# Patient Record
Sex: FEMALE | Race: WHITE | Employment: OTHER | URBAN - METROPOLITAN AREA
[De-identification: names, ages, dates, MRNs, and addresses within clinical notes are randomized per-mention and may not be internally consistent; named-entity substitution may affect disease eponyms.]

---

## 2017-01-30 ENCOUNTER — ANTI-COAG VISIT (OUTPATIENT)
Dept: FAMILY MEDICINE CLINIC | Facility: CLINIC | Age: 67
End: 2017-01-30

## 2017-01-30 DIAGNOSIS — Z79.01 LONG TERM CURRENT USE OF ANTICOAGULANT THERAPY: Primary | ICD-10-CM

## 2017-01-30 PROBLEM — D68.59 PRIMARY HYPERCOAGULABLE STATE (HCC): Status: ACTIVE | Noted: 2017-01-30

## 2017-01-30 PROBLEM — F32.1 MAJOR DEPRESSIVE DISORDER, SINGLE EPISODE, MODERATE (HCC): Status: ACTIVE | Noted: 2017-01-30

## 2017-01-30 LAB
INR: 2 (ref 0.8–1.2)

## 2017-01-30 RX ORDER — CHLORAL HYDRATE 500 MG
1000 CAPSULE ORAL DAILY
COMMUNITY
Start: 2012-02-01

## 2017-01-30 RX ORDER — CELECOXIB 200 MG/1
CAPSULE ORAL
COMMUNITY
Start: 2007-01-29 | End: 2017-07-17

## 2017-01-30 RX ORDER — ESOMEPRAZOLE MAGNESIUM 40 MG/1
CAPSULE, DELAYED RELEASE ORAL
COMMUNITY
Start: 2009-02-25 | End: 2017-07-16

## 2017-01-30 RX ORDER — WARFARIN SODIUM 1 MG/1
TABLET ORAL
COMMUNITY
Start: 2014-04-15 | End: 2018-01-15

## 2017-01-30 RX ORDER — LYSINE 500 MG
1 TABLET ORAL DAILY
COMMUNITY
Start: 2012-02-01 | End: 2019-03-22

## 2017-01-30 RX ORDER — DIMENHYDRINATE 50 MG
1 TABLET ORAL DAILY
COMMUNITY
Start: 2013-05-22

## 2017-01-30 RX ORDER — BIOTIN 5 MG
1 TABLET ORAL DAILY
COMMUNITY
Start: 2014-04-01

## 2017-01-30 RX ORDER — TRIAMCINOLONE ACETONIDE 5 MG/G
CREAM TOPICAL
COMMUNITY
Start: 2011-04-05 | End: 2017-04-20

## 2017-01-30 RX ORDER — DESONIDE 0.5 MG/G
CREAM TOPICAL AS NEEDED
COMMUNITY
Start: 2009-01-07 | End: 2018-03-14

## 2017-01-30 RX ORDER — FLUOXETINE HYDROCHLORIDE 40 MG/1
CAPSULE ORAL
COMMUNITY
Start: 2014-09-03 | End: 2017-03-30

## 2017-01-30 RX ORDER — METOPROLOL SUCCINATE 50 MG/1
TABLET, EXTENDED RELEASE ORAL
COMMUNITY
Start: 2011-02-22 | End: 2017-07-16

## 2017-01-30 RX ORDER — MULTIVITAMIN
1 CAPSULE ORAL DAILY
COMMUNITY
Start: 2007-01-30

## 2017-01-30 RX ORDER — WARFARIN SODIUM 5 MG/1
TABLET ORAL
COMMUNITY
Start: 2014-03-05 | End: 2018-01-15

## 2017-01-30 NOTE — PROGRESS NOTES
Home INR meter: INR is in goal range at 2.0. CPM coumadin. Michelle Kaye left for patient to return call. Leana Fine, 01/30/2017, 4:03 PM  Patient notified of coumadin instructions.  Leana Fine, 01/31/2017, 2:07 PM

## 2017-02-01 NOTE — PATIENT INSTRUCTIONS
INR is in goal range. Continue same coumadin dosage. Watch for bleeding problems such as black tarry stools, blood in urine, frequent or prolonged nose bleeds, unusual bruising, or any other unusual bleeding.  Call or seek medical attention ( if office

## 2017-03-08 ENCOUNTER — ANTI-COAG VISIT (OUTPATIENT)
Dept: FAMILY MEDICINE CLINIC | Facility: CLINIC | Age: 67
End: 2017-03-08

## 2017-03-08 DIAGNOSIS — Z79.01 LONG TERM CURRENT USE OF ANTICOAGULANT THERAPY: Primary | ICD-10-CM

## 2017-03-08 LAB — INR: 2.7 (ref 0.8–1.2)

## 2017-03-23 ENCOUNTER — ANTI-COAG VISIT (OUTPATIENT)
Dept: FAMILY MEDICINE CLINIC | Facility: CLINIC | Age: 67
End: 2017-03-23

## 2017-03-23 DIAGNOSIS — Z79.01 LONG TERM CURRENT USE OF ANTICOAGULANT THERAPY: Primary | ICD-10-CM

## 2017-03-23 LAB — INR: 2.6 (ref 0.8–1.2)

## 2017-03-23 NOTE — PROGRESS NOTES
INR on home meter is in goal range. CPM of coumadin and recheck INR in 2 weeks. Patient notified of coumadin instructions.

## 2017-03-31 RX ORDER — FLUOXETINE HYDROCHLORIDE 40 MG/1
CAPSULE ORAL
Qty: 90 CAPSULE | Refills: 1 | Status: SHIPPED | OUTPATIENT
Start: 2017-03-31 | End: 2018-01-29

## 2017-04-20 RX ORDER — TRIAMCINOLONE ACETONIDE 5 MG/G
CREAM TOPICAL
Qty: 15 G | Refills: 2 | Status: SHIPPED | OUTPATIENT
Start: 2017-04-20 | End: 2018-03-14

## 2017-04-25 ENCOUNTER — TELEPHONE (OUTPATIENT)
Dept: FAMILY MEDICINE CLINIC | Facility: CLINIC | Age: 67
End: 2017-04-25

## 2017-04-25 ENCOUNTER — ANTI-COAG VISIT (OUTPATIENT)
Dept: FAMILY MEDICINE CLINIC | Facility: CLINIC | Age: 67
End: 2017-04-25

## 2017-04-25 DIAGNOSIS — Z79.01 LONG TERM CURRENT USE OF ANTICOAGULANT THERAPY: Primary | ICD-10-CM

## 2017-04-25 NOTE — TELEPHONE ENCOUNTER
Patient returned call with INR results from today. See anticoagulation encounter for INR and coumadin management.

## 2017-04-25 NOTE — TELEPHONE ENCOUNTER
FYI:  Patient called to report INR results she took on home meter. INR is 4.0. States she accidentally took her coumadin dose twice yesterday for a total of 16mg. Her normal dose for Monday is 8mg. Denies any symptoms of bleeding.      Patient advised t

## 2017-04-25 NOTE — PROGRESS NOTES
Patient called with INR results from home meter. INR too high at 4.0. Patient states she thinks she took two tablets yesterday by accident. This was a total of 16mg of coumadin. Her normal dose for mondays is 8mg. Denies any signs of bleeding.   Advise

## 2017-04-26 ENCOUNTER — TELEPHONE (OUTPATIENT)
Dept: FAMILY MEDICINE CLINIC | Facility: CLINIC | Age: 67
End: 2017-04-26

## 2017-04-26 ENCOUNTER — ANTI-COAG VISIT (OUTPATIENT)
Dept: FAMILY MEDICINE CLINIC | Facility: CLINIC | Age: 67
End: 2017-04-26

## 2017-04-26 DIAGNOSIS — Z79.01 LONG TERM CURRENT USE OF ANTICOAGULANT THERAPY: Primary | ICD-10-CM

## 2017-04-26 NOTE — TELEPHONE ENCOUNTER
Patient was to check INR today on home meter. No results by fax from [facility] received. Message left for patient to check INR as soon as possible. ( see previous anticoag encounters- coumadin overdose this week.  Needs daily INRs this week per Dr. Stevie Black

## 2017-04-26 NOTE — PROGRESS NOTES
INR coming down toward goal at 3.2. Had taken overdose of coumadin on 4/24/17 by accident. Per order Dr. Celeste Simmons, patient to check INR daily this week. Denies any signs of bleeding . Patient informed of coumadin instructions.

## 2017-04-27 ENCOUNTER — ANTI-COAG VISIT (OUTPATIENT)
Dept: FAMILY MEDICINE CLINIC | Facility: CLINIC | Age: 67
End: 2017-04-27

## 2017-04-27 DIAGNOSIS — Z79.01 LONG TERM CURRENT USE OF ANTICOAGULANT THERAPY: Primary | ICD-10-CM

## 2017-04-27 NOTE — PROGRESS NOTES
Patient called with home INR result. INR 2.1 today. Resume regular coumadin dose now and check INR again tomorrow per Dr. Andrew Quiroga. Ravi's previous order. ( accidental coumadin overdose 3 days ago)   Patient notified of coumadin instructions.

## 2017-05-01 ENCOUNTER — ANTI-COAG VISIT (OUTPATIENT)
Dept: FAMILY MEDICINE CLINIC | Facility: CLINIC | Age: 67
End: 2017-05-01

## 2017-05-01 DIAGNOSIS — Z79.01 LONG TERM CURRENT USE OF ANTICOAGULANT THERAPY: Primary | ICD-10-CM

## 2017-05-01 NOTE — PROGRESS NOTES
INR on home meter is in goal range. Resume regular coumadin dose. INR in one week. Patient notified of coumadin instructions.

## 2017-05-08 ENCOUNTER — ANTI-COAG VISIT (OUTPATIENT)
Dept: FAMILY MEDICINE CLINIC | Facility: CLINIC | Age: 67
End: 2017-05-08

## 2017-05-08 DIAGNOSIS — Z79.01 LONG TERM CURRENT USE OF ANTICOAGULANT THERAPY: Primary | ICD-10-CM

## 2017-05-08 NOTE — PROGRESS NOTES
Patient calling with INR results  INR on home meter is in goal range. CPM of coumadin and recheck INR in 2 weeks. Patient notified of coumadin instructions.

## 2017-05-22 ENCOUNTER — ANTI-COAG VISIT (OUTPATIENT)
Dept: FAMILY MEDICINE CLINIC | Facility: CLINIC | Age: 67
End: 2017-05-22

## 2017-05-22 DIAGNOSIS — Z79.01 LONG TERM CURRENT USE OF ANTICOAGULANT THERAPY: Primary | ICD-10-CM

## 2017-05-22 NOTE — PROGRESS NOTES
INR in goal at 2.9. [place] so not eating as much greens and vegetables. Will try to increase salad intake to one more weekly. INR in one month on home meter. Fax order sent to [facility] to resume home INR testing 1-4x monthly.

## 2017-06-24 ENCOUNTER — ANTI-COAG VISIT (OUTPATIENT)
Dept: FAMILY MEDICINE CLINIC | Facility: CLINIC | Age: 67
End: 2017-06-24

## 2017-06-24 DIAGNOSIS — Z79.01 LONG TERM CURRENT USE OF ANTICOAGULANT THERAPY: ICD-10-CM

## 2017-07-17 RX ORDER — CELECOXIB 200 MG/1
CAPSULE ORAL
Qty: 90 CAPSULE | Refills: 1 | Status: SHIPPED | OUTPATIENT
Start: 2017-07-17 | End: 2018-01-15

## 2017-07-18 RX ORDER — ESOMEPRAZOLE MAGNESIUM 40 MG/1
CAPSULE, DELAYED RELEASE ORAL
Qty: 90 CAPSULE | Refills: 0 | Status: SHIPPED | OUTPATIENT
Start: 2017-07-18 | End: 2017-10-13

## 2017-07-18 RX ORDER — METOPROLOL SUCCINATE 50 MG/1
TABLET, EXTENDED RELEASE ORAL
Qty: 90 TABLET | Refills: 0 | Status: SHIPPED | OUTPATIENT
Start: 2017-07-18 | End: 2017-09-25

## 2017-07-25 ENCOUNTER — ANTI-COAG VISIT (OUTPATIENT)
Dept: FAMILY MEDICINE CLINIC | Facility: CLINIC | Age: 67
End: 2017-07-25

## 2017-07-25 ENCOUNTER — TELEPHONE (OUTPATIENT)
Dept: FAMILY MEDICINE CLINIC | Facility: CLINIC | Age: 67
End: 2017-07-25

## 2017-07-25 DIAGNOSIS — Z79.01 LONG TERM CURRENT USE OF ANTICOAGULANT THERAPY: ICD-10-CM

## 2017-07-25 LAB — INR: 4.7 (ref 0.8–1.2)

## 2017-07-25 NOTE — PROGRESS NOTES
INR too high at 4.7 on home meter. Patient traveling, not eating much vegetables/greens. Also has had stomache flu symptoms with diarrhea. Improving now. Denies any black or bloody stool. Hold coumadin today. Check INR tomorrow.    Eat a serving of v

## 2017-07-26 ENCOUNTER — ANTI-COAG VISIT (OUTPATIENT)
Dept: FAMILY MEDICINE CLINIC | Facility: CLINIC | Age: 67
End: 2017-07-26

## 2017-07-26 DIAGNOSIS — Z79.01 LONG TERM CURRENT USE OF ANTICOAGULANT THERAPY: ICD-10-CM

## 2017-07-26 LAB — INR: 2.9 (ref 0.8–1.2)

## 2017-07-26 NOTE — PROGRESS NOTES
INR down into goal range now at 2.9. Resume coumadin at regular dose and check INR on home meter in 3 days. ( yesterday INR had been elevated to 4.7 due to travel and great change in diet)     Patient notified of coumadin instructions.    Still travelin

## 2017-07-28 ENCOUNTER — TELEPHONE (OUTPATIENT)
Dept: FAMILY MEDICINE CLINIC | Facility: CLINIC | Age: 67
End: 2017-07-28

## 2017-07-28 ENCOUNTER — ANTI-COAG VISIT (OUTPATIENT)
Dept: FAMILY MEDICINE CLINIC | Facility: CLINIC | Age: 67
End: 2017-07-28

## 2017-07-28 DIAGNOSIS — Z79.01 LONG TERM CURRENT USE OF ANTICOAGULANT THERAPY: ICD-10-CM

## 2017-07-28 LAB — INR: 1.9 (ref 0.8–1.2)

## 2017-07-28 NOTE — TELEPHONE ENCOUNTER
Called pt- Whitley Vallecillo states she will check her INR this afternoon and call back. Pt held her Coumadin on Tuesday- states she is back on her normal regimen-  Pt takes 6mg on M,W,F and 8mg rest of days. Pt will call back w/ her INR.

## 2017-07-28 NOTE — TELEPHONE ENCOUNTER
----- Message from Cali Mike RN sent at 7/26/2017  3:41 PM CDT -----  Darrell Knox had a 4.7 INR on Tuesday on home meter. The only change she had is that she is traveling and is not eating any vegetables/salads.     Held her coumadin Tuesday night and advise

## 2017-07-28 NOTE — PROGRESS NOTES
New instructions given to day. Track updated. Pt will call back with INR on 8/11-pt has [employer] monitor.

## 2017-07-28 NOTE — PROGRESS NOTES
Pt Coumadin was held on Tuesday - pt resumed normal schedule on Wed. Pt taking 6mg on M/W/F and 8mg rest of days. INR today was 1.9. Pt states she ate a big salad yesterday. Please advise.

## 2017-08-28 ENCOUNTER — ANTI-COAG VISIT (OUTPATIENT)
Dept: FAMILY MEDICINE CLINIC | Facility: CLINIC | Age: 67
End: 2017-08-28

## 2017-08-28 ENCOUNTER — TELEPHONE (OUTPATIENT)
Dept: FAMILY MEDICINE CLINIC | Facility: CLINIC | Age: 67
End: 2017-08-28

## 2017-08-28 DIAGNOSIS — Z79.01 LONG TERM CURRENT USE OF ANTICOAGULANT THERAPY: ICD-10-CM

## 2017-08-28 LAB — INR: 3.2 (ref 0.8–1.2)

## 2017-08-28 NOTE — PROGRESS NOTES
INR slightly elevated at 3.2  Take coumadin 2.5mg today, then 6mg M,W,F and 8mg daily the rest of the week. INR in 10 days on home meter.

## 2017-09-12 ENCOUNTER — ANTI-COAG VISIT (OUTPATIENT)
Dept: FAMILY MEDICINE CLINIC | Facility: CLINIC | Age: 67
End: 2017-09-12

## 2017-09-12 ENCOUNTER — TELEPHONE (OUTPATIENT)
Dept: FAMILY MEDICINE CLINIC | Facility: CLINIC | Age: 67
End: 2017-09-12

## 2017-09-12 DIAGNOSIS — Z79.01 LONG TERM CURRENT USE OF ANTICOAGULANT THERAPY: ICD-10-CM

## 2017-09-12 LAB — INR: 3.9 (ref 0.8–1.2)

## 2017-09-12 NOTE — PROGRESS NOTES
INR elevated at 3.9. Increased stress  Hold coumadin today, then resume 6mg M,W,F and 8mg daily the rest of the week. Next INR in one week on home meter. Patient notified of coumadin instructions.

## 2017-09-19 LAB — INR: 3.1 (ref 0.8–1.2)

## 2017-09-20 ENCOUNTER — ANTI-COAG VISIT (OUTPATIENT)
Dept: FAMILY MEDICINE CLINIC | Facility: CLINIC | Age: 67
End: 2017-09-20

## 2017-09-20 DIAGNOSIS — Z79.01 LONG TERM CURRENT USE OF ANTICOAGULANT THERAPY: ICD-10-CM

## 2017-09-20 NOTE — PROGRESS NOTES
Patient checked INR on home meter yesterday evening and it was 3.1  She decreased coumadin dose last evening and took only 5mg. Now resume regular coumadin dose of 6mg M,W,F and 8mg daily the rest of the week. Next INR due in one week on home meter.

## 2017-09-25 ENCOUNTER — TELEPHONE (OUTPATIENT)
Dept: FAMILY MEDICINE CLINIC | Facility: CLINIC | Age: 67
End: 2017-09-25

## 2017-09-25 ENCOUNTER — OFFICE VISIT (OUTPATIENT)
Dept: FAMILY MEDICINE CLINIC | Facility: CLINIC | Age: 67
End: 2017-09-25

## 2017-09-25 VITALS
WEIGHT: 263.25 LBS | TEMPERATURE: 98 F | HEART RATE: 70 BPM | OXYGEN SATURATION: 97 % | BODY MASS INDEX: 47.23 KG/M2 | SYSTOLIC BLOOD PRESSURE: 132 MMHG | HEIGHT: 62.75 IN | RESPIRATION RATE: 18 BRPM | DIASTOLIC BLOOD PRESSURE: 74 MMHG

## 2017-09-25 DIAGNOSIS — I10 HYPERTENSION, UNCONTROLLED: Primary | ICD-10-CM

## 2017-09-25 DIAGNOSIS — R06.00 DYSPNEA ON EXERTION: ICD-10-CM

## 2017-09-25 PROCEDURE — 93000 ELECTROCARDIOGRAM COMPLETE: CPT | Performed by: FAMILY MEDICINE

## 2017-09-25 PROCEDURE — 99215 OFFICE O/P EST HI 40 MIN: CPT | Performed by: FAMILY MEDICINE

## 2017-09-25 RX ORDER — FLUOXETINE HYDROCHLORIDE 40 MG/1
CAPSULE ORAL
Qty: 90 CAPSULE | Refills: 1 | OUTPATIENT
Start: 2017-09-25

## 2017-09-25 RX ORDER — METOPROLOL SUCCINATE 100 MG/1
100 TABLET, EXTENDED RELEASE ORAL DAILY
Qty: 90 TABLET | Refills: 3 | Status: SHIPPED | OUTPATIENT
Start: 2017-09-25 | End: 2018-03-14

## 2017-09-25 NOTE — TELEPHONE ENCOUNTER
Future appt:    Last Appointment:  1/18/17-  Pt was asked to return back in 2 months at that time. Last lab: 1/15/17  Last refilled:  3/31/17-  6 month refill given at that time    Called pt- urged to schedule med f/u appt.       Pt called back (stated ashutosh

## 2017-09-25 NOTE — PROGRESS NOTES
Chief Complaint:   Patient presents with:  Hypertension: Yue had her blodd pressure checked at the chiropracter and it was high, patient also mentioned that she has noticed her pulse has gotten as high as 142 according to her fit bit.  She also stated t 40 MG Oral Cap TAKE 1 CAPSULE DAILY Disp: 90 capsule Rfl: 1   Desonide (DESOWEN) 0.05 % External Cream as needed. Disp:  Rfl:    Flaxseed, Linseed, (FLAX SEED OIL) 1000 MG Oral Cap Take 1 capsule by mouth daily.    Disp:  Rfl:    Krill Oil 1000 MG Oral Ca Resp 18   Ht 62.75\"   Wt 263 lb 4 oz   SpO2 97%   BMI 47.00 kg/m²  Estimated body mass index is 47 kg/m² as calculated from the following:    Height as of this encounter: 62.75\". Weight as of this encounter: 263 lb 4 oz. Vital signs reviewed. Appear complications from the treatments as a result of today.      Problem List:  Patient Active Problem List:     Long term current use of anticoagulant therapy     Anxiety state     Cerebral aneurysm, nonruptured     S/P cholecystectomy     Contusion of toe

## 2017-09-25 NOTE — TELEPHONE ENCOUNTER
Pt c/o of elevated /90's, pt also c/o shortness of breath with activity and elevated pulse with activity. Pt asked to see TR,  Call transferred to appt desk.     Future Appointments  Date Time Provider Soraya Roberts   9/25/2017 1:45 PM MAKENNA Rodrigues

## 2017-09-25 NOTE — PATIENT INSTRUCTIONS
Increase dosing of metoprolol  - 100 mg per dose. Check blood pressure and pulse every other day.      Keep appointment with dr. Jez Garza

## 2017-09-26 LAB — INR: 1.9 (ref 0.8–1.2)

## 2017-09-27 ENCOUNTER — ANTI-COAG VISIT (OUTPATIENT)
Dept: FAMILY MEDICINE CLINIC | Facility: CLINIC | Age: 67
End: 2017-09-27

## 2017-09-27 DIAGNOSIS — Z79.01 LONG TERM CURRENT USE OF ANTICOAGULANT THERAPY: ICD-10-CM

## 2017-09-27 NOTE — PROGRESS NOTES
Spoke with pt- stats she is concerned re: instructions since she is low. Pt was taking 6mg Mon, Wed, FRiday and 8mg the rest of the week. Pt is concerned that she needs an increase due to low INR being 1.9.     Current instructions per EL are to to take 8

## 2017-09-27 NOTE — PROGRESS NOTES
Left message for patient to return the call regarding questions for INR. Left 2nd message regarding above . INR - 1.9  Current dose of coumadin is 6 mg on M, W, F. And 8 mg the rest of the week. Please manage coumadin dosing.

## 2017-09-28 NOTE — PROGRESS NOTES
It looks like the 6 mg Monday Wednesday Friday and 8 mg rest of the week (50mg/week) is too much Coumadin since the last several INRs have been elevated.  On August 26 she had an INR of 1.9 and we returned her to her current pattern and she shot up to 3.9

## 2017-09-28 NOTE — PROGRESS NOTES
informed patient of the following below per Jennifer Villegas. Will send to [facility] to look over. No other questions at this time.

## 2017-10-04 ENCOUNTER — TELEPHONE (OUTPATIENT)
Dept: FAMILY MEDICINE CLINIC | Facility: CLINIC | Age: 67
End: 2017-10-04

## 2017-10-04 NOTE — TELEPHONE ENCOUNTER
Asking when her next INR is due? Advised she is due for INR this week. She will check on home meter today.

## 2017-10-05 ENCOUNTER — TELEPHONE (OUTPATIENT)
Dept: FAMILY MEDICINE CLINIC | Facility: CLINIC | Age: 67
End: 2017-10-05

## 2017-10-05 ENCOUNTER — ANTI-COAG VISIT (OUTPATIENT)
Dept: FAMILY MEDICINE CLINIC | Facility: CLINIC | Age: 67
End: 2017-10-05

## 2017-10-05 DIAGNOSIS — Z79.01 LONG TERM CURRENT USE OF ANTICOAGULANT THERAPY: ICD-10-CM

## 2017-10-05 NOTE — PROGRESS NOTES
INR elevated at 3.4  Patient did not take correct dose. She took 8mg Tu,Th,Sa and 6mg daily the rest of the week. Today take 5mg, then 8mg Tu, Sa and 6mg daily the rest of the week. Next INR on home meter in one week.    Patient notified of coumadin ins

## 2017-10-10 ENCOUNTER — TELEPHONE (OUTPATIENT)
Dept: FAMILY MEDICINE CLINIC | Facility: CLINIC | Age: 67
End: 2017-10-10

## 2017-10-10 NOTE — TELEPHONE ENCOUNTER
Fax from [facility] home monitoring benefit department stating patient's insurance company is requesting additional documentation (including a copy of most recent physical and letter of medical necessity) to confirm medical necessity for Home INR Monitoring ser

## 2017-10-10 NOTE — TELEPHONE ENCOUNTER
Dr. Karol Lo wrote letter that [facility] requested. Patient notified of request from [facility]. Patient states her insurance company was only covering testing one time monthly.    [facility] prescriptions are for 1-4 times per month (original script 7/26/13, renewed

## 2017-10-11 ENCOUNTER — OFFICE VISIT (OUTPATIENT)
Dept: FAMILY MEDICINE CLINIC | Facility: CLINIC | Age: 67
End: 2017-10-11

## 2017-10-11 ENCOUNTER — ANTI-COAG VISIT (OUTPATIENT)
Dept: FAMILY MEDICINE CLINIC | Facility: CLINIC | Age: 67
End: 2017-10-11

## 2017-10-11 VITALS
TEMPERATURE: 99 F | BODY MASS INDEX: 47.21 KG/M2 | SYSTOLIC BLOOD PRESSURE: 122 MMHG | RESPIRATION RATE: 16 BRPM | WEIGHT: 263.13 LBS | DIASTOLIC BLOOD PRESSURE: 78 MMHG | HEART RATE: 76 BPM | HEIGHT: 62.75 IN

## 2017-10-11 DIAGNOSIS — E78.49 FAMILIAL MULTIPLE LIPOPROTEIN-TYPE HYPERLIPIDEMIA: ICD-10-CM

## 2017-10-11 DIAGNOSIS — F32.1 MAJOR DEPRESSIVE DISORDER, SINGLE EPISODE, MODERATE (HCC): ICD-10-CM

## 2017-10-11 DIAGNOSIS — I10 BENIGN ESSENTIAL HYPERTENSION: Primary | ICD-10-CM

## 2017-10-11 DIAGNOSIS — D68.59 PRIMARY HYPERCOAGULABLE STATE (HCC): ICD-10-CM

## 2017-10-11 DIAGNOSIS — R06.00 DYSPNEA ON EXERTION: ICD-10-CM

## 2017-10-11 DIAGNOSIS — Z79.01 LONG TERM CURRENT USE OF ANTICOAGULANT THERAPY: ICD-10-CM

## 2017-10-11 DIAGNOSIS — Z00.00 ROUTINE GENERAL MEDICAL EXAMINATION AT A HEALTH CARE FACILITY: ICD-10-CM

## 2017-10-11 PROCEDURE — 90653 IIV ADJUVANT VACCINE IM: CPT | Performed by: FAMILY MEDICINE

## 2017-10-11 PROCEDURE — 90471 IMMUNIZATION ADMIN: CPT | Performed by: FAMILY MEDICINE

## 2017-10-11 PROCEDURE — 90472 IMMUNIZATION ADMIN EACH ADD: CPT | Performed by: FAMILY MEDICINE

## 2017-10-11 PROCEDURE — 99215 OFFICE O/P EST HI 40 MIN: CPT | Performed by: FAMILY MEDICINE

## 2017-10-11 PROCEDURE — 90732 PPSV23 VACC 2 YRS+ SUBQ/IM: CPT | Performed by: FAMILY MEDICINE

## 2017-10-11 NOTE — PATIENT INSTRUCTIONS
rec fasting labs    rec nuclear stress test- unable to walk due to back pain, hx of laminectomy    Encourage heart healthy diet    Flu vaccine today  pneumovac today

## 2017-10-11 NOTE — PROGRESS NOTES
Lizzie Garcia is a 75year old female. HPI:   Patient presents for recheck of her hypertension. Pt with meds increased last month--pt with sob, increased med and the symptoms improved. Pt continue sob with walking .   Left arm and fingers can get tingl Depression    • GERD (gastroesophageal reflux disease)    • Hyperlipidemia    • Hypertension    • Migraine       Past Surgical History:  No date: CHOLECYSTECTOMY  No date: COLONOSCOPY  No date: LAMINECTOMY  No date: TUBAL LIGATION   Social History:  [employer] ANTIBODY; Future  - LIPID PANEL; Future  - TSH W REFLEX TO FREE T4; Future  - URINALYSIS WITH CULTURE REFLEX; Future  - CARD NUC STRESS TEST LEXISCAN; Future        4. Long term current use of anticoagulant therapy  stable    5.  Primary hypercoagulable sta

## 2017-10-11 NOTE — PROGRESS NOTES
INR in goal at 2.7  Take coumadin 8mg Tu and Sat and 6mg daily the rest of the week. Had flu vaccine and pneumonia vaccine today at office visit with doctor. INR again in one week on home meter. Patient notified of instructions.

## 2017-10-12 ENCOUNTER — LABORATORY ENCOUNTER (OUTPATIENT)
Dept: LAB | Age: 67
End: 2017-10-12
Attending: FAMILY MEDICINE
Payer: COMMERCIAL

## 2017-10-12 ENCOUNTER — TELEPHONE (OUTPATIENT)
Dept: FAMILY MEDICINE CLINIC | Facility: CLINIC | Age: 67
End: 2017-10-12

## 2017-10-12 DIAGNOSIS — I10 BENIGN ESSENTIAL HYPERTENSION: ICD-10-CM

## 2017-10-12 DIAGNOSIS — R06.00 DYSPNEA ON EXERTION: ICD-10-CM

## 2017-10-12 PROCEDURE — 87086 URINE CULTURE/COLONY COUNT: CPT | Performed by: FAMILY MEDICINE

## 2017-10-12 PROCEDURE — 36415 COLL VENOUS BLD VENIPUNCTURE: CPT | Performed by: FAMILY MEDICINE

## 2017-10-12 PROCEDURE — 86803 HEPATITIS C AB TEST: CPT | Performed by: FAMILY MEDICINE

## 2017-10-12 PROCEDURE — 81001 URINALYSIS AUTO W/SCOPE: CPT | Performed by: FAMILY MEDICINE

## 2017-10-12 PROCEDURE — 80050 GENERAL HEALTH PANEL: CPT | Performed by: FAMILY MEDICINE

## 2017-10-12 PROCEDURE — 80061 LIPID PANEL: CPT | Performed by: FAMILY MEDICINE

## 2017-10-12 NOTE — TELEPHONE ENCOUNTER
Received incoming fax from [facility] stating they need a Statement of Medical Necessity with the specific reason the patient is to test at home versus testing in a lab.    Please advise

## 2017-10-12 NOTE — TELEPHONE ENCOUNTER
Patient signed record release. Records and letter of medical necessity faxed to [facility] benefits department.  [phone]

## 2017-10-13 NOTE — TELEPHONE ENCOUNTER
Note was provided per CR and given to Geovanna Sanderson. - please see phone encounter from 10/10- note was faxed. To await Geovanna Sanderson. Return Sat.

## 2017-10-14 ENCOUNTER — TELEPHONE (OUTPATIENT)
Dept: FAMILY MEDICINE CLINIC | Facility: CLINIC | Age: 67
End: 2017-10-14

## 2017-10-14 RX ORDER — ESOMEPRAZOLE MAGNESIUM 40 MG/1
CAPSULE, DELAYED RELEASE ORAL
Qty: 90 CAPSULE | Refills: 3 | Status: SHIPPED | OUTPATIENT
Start: 2017-10-14 | End: 2018-10-09

## 2017-10-14 NOTE — TELEPHONE ENCOUNTER
Pt informed of results-  pt states she is not able void fully- noticed s/s last 1-2 months. No fever, no pain, no burning.

## 2017-10-14 NOTE — TELEPHONE ENCOUNTER
----- Message from Mickie Zhu MD sent at 10/14/2017  7:46 AM CDT -----  Reviewed  ua abnormal, culture with contaminant    Hep c screen negative  Lipids, chem, tsh, cbc- all stable/ normal and reassuring

## 2017-10-14 NOTE — TELEPHONE ENCOUNTER
Pls encourage increase water intake, monitor, if persisits, consider return for pelvic exam and repeat urine.

## 2017-10-14 NOTE — TELEPHONE ENCOUNTER
Future appt: Patient does not have an upcoming appt scheduled       Last Appointment:  10/11/2017    Medication last refilled: 7/18/17   #90 with no refills.     Cholesterol, Total (mg/dL)   Date Value   10/12/2017 201 (H)   ----------  HDL Cholesterol (mg/

## 2017-10-16 NOTE — TELEPHONE ENCOUNTER
Spoke with Pamella at [facility] monitoring in the benefits department and she confirms that [facility] did receive the requested records and statement of medical necessity for home INR testing on 10/12/17  She states to disregard second fax with request for this s

## 2017-10-17 ENCOUNTER — TELEPHONE (OUTPATIENT)
Dept: FAMILY MEDICINE CLINIC | Facility: CLINIC | Age: 67
End: 2017-10-17

## 2017-10-17 NOTE — TELEPHONE ENCOUNTER
stress test @ [facility] tomorrow  10/18   they told her to check with Dr Iza Leyva about taking her medications    Please advise

## 2017-10-17 NOTE — TELEPHONE ENCOUNTER
Pt has her stress test tomorrow at noon. Pt states she was told to follow up with PCP re: meds. Pt takes her Celebrex, Metoprolol, and Nexium in AM-  Pt was told to ask if she can take her meds prior to testing?

## 2017-10-18 ENCOUNTER — TELEPHONE (OUTPATIENT)
Dept: FAMILY MEDICINE CLINIC | Facility: CLINIC | Age: 67
End: 2017-10-18

## 2017-10-18 NOTE — TELEPHONE ENCOUNTER
Lexiscan Myocardial Perfusion Stress Test done at [facility]. Gigi's 10/18/17-   Reviewed per CR- pt informed study is negative-  Overall study reassuring. Pt was informed.

## 2017-10-23 ENCOUNTER — TELEPHONE (OUTPATIENT)
Dept: FAMILY MEDICINE CLINIC | Facility: CLINIC | Age: 67
End: 2017-10-23

## 2017-10-24 ENCOUNTER — ANTI-COAG VISIT (OUTPATIENT)
Dept: FAMILY MEDICINE CLINIC | Facility: CLINIC | Age: 67
End: 2017-10-24

## 2017-10-24 DIAGNOSIS — Z79.01 LONG TERM CURRENT USE OF ANTICOAGULANT THERAPY: ICD-10-CM

## 2017-10-24 NOTE — PROGRESS NOTES
INR a little elevated at 3.1. Did have flu vaccine last week. Decrease coumadin dose to 6mg daily. Next INR in one week on home meter  Patient notified of instructions.

## 2017-11-06 ENCOUNTER — ANTI-COAG VISIT (OUTPATIENT)
Dept: FAMILY MEDICINE CLINIC | Facility: CLINIC | Age: 67
End: 2017-11-06

## 2017-11-06 DIAGNOSIS — Z79.01 LONG TERM CURRENT USE OF ANTICOAGULANT THERAPY: ICD-10-CM

## 2017-11-06 NOTE — PROGRESS NOTES
INR elevated at 3.9 thru [facility] home monitoring  HOLD coumadin today, then 6mg daily  Next INR on home meter in one week. Patient notified of instructions.

## 2017-11-13 ENCOUNTER — ANTI-COAG VISIT (OUTPATIENT)
Dept: FAMILY MEDICINE CLINIC | Facility: CLINIC | Age: 67
End: 2017-11-13

## 2017-11-13 DIAGNOSIS — Z79.01 LONG TERM CURRENT USE OF ANTICOAGULANT THERAPY: ICD-10-CM

## 2017-11-13 NOTE — PROGRESS NOTES
INR in goal range at 2.1 on [facility] home meter  Take coumadin 8mg Tu and 6mg daily the rest of the week. Patient realized she had been taking a probiotic while in [place]. She has now stopped taking it. She is back in [place].    Check INR on home meter a

## 2017-11-20 ENCOUNTER — ANTI-COAG VISIT (OUTPATIENT)
Dept: FAMILY MEDICINE CLINIC | Facility: CLINIC | Age: 67
End: 2017-11-20

## 2017-11-20 DIAGNOSIS — Z79.01 LONG TERM CURRENT USE OF ANTICOAGULANT THERAPY: ICD-10-CM

## 2017-11-20 NOTE — PROGRESS NOTES
INR in goal at 2.8, but rising again. Patient states she has not eaten any of her normal salads. Requests to eat 2 salads per week. CPM of coumadin 8mg Tu and 6mg daily the rest of the week   Check INR on home meter again in one week.

## 2017-11-28 ENCOUNTER — ANTI-COAG VISIT (OUTPATIENT)
Dept: FAMILY MEDICINE CLINIC | Facility: CLINIC | Age: 67
End: 2017-11-28

## 2017-11-28 DIAGNOSIS — Z79.01 LONG TERM CURRENT USE OF ANTICOAGULANT THERAPY: ICD-10-CM

## 2017-12-11 ENCOUNTER — ANTI-COAG VISIT (OUTPATIENT)
Dept: FAMILY MEDICINE CLINIC | Facility: CLINIC | Age: 67
End: 2017-12-11

## 2017-12-11 DIAGNOSIS — Z79.01 LONG TERM CURRENT USE OF ANTICOAGULANT THERAPY: ICD-10-CM

## 2018-01-03 ENCOUNTER — TELEPHONE (OUTPATIENT)
Dept: FAMILY MEDICINE CLINIC | Facility: CLINIC | Age: 68
End: 2018-01-03

## 2018-01-03 ENCOUNTER — ANTI-COAG VISIT (OUTPATIENT)
Dept: FAMILY MEDICINE CLINIC | Facility: CLINIC | Age: 68
End: 2018-01-03

## 2018-01-03 DIAGNOSIS — Z79.01 LONG TERM CURRENT USE OF ANTICOAGULANT THERAPY: ICD-10-CM

## 2018-01-03 LAB — INR: 3.4 (ref 0.8–1.2)

## 2018-01-03 NOTE — PROGRESS NOTES
INR checked by patient on home meter. INR elevated at 3.4  Patient had GI virus this week with diarrhea and vomiting. Now resolving. Was not eating much. Less vitamin K. HOLD coumadin today. INR on home meter in one week.    Patient notified of instr

## 2018-01-09 ENCOUNTER — ANTI-COAG VISIT (OUTPATIENT)
Dept: FAMILY MEDICINE CLINIC | Facility: CLINIC | Age: 68
End: 2018-01-09

## 2018-01-09 DIAGNOSIS — Z79.01 LONG TERM CURRENT USE OF ANTICOAGULANT THERAPY: ICD-10-CM

## 2018-01-09 LAB — INR: 2.6 (ref 0.8–1.2)

## 2018-01-09 NOTE — PROGRESS NOTES
INR checked by patient on home meter. INR in goal range now at 2.6  Will be traveling by plane to [place] for a week tomorrow. CPM coumadin 6mg daily. Next INR due 2 weeks on home meter. Patient notified of coumadin instructions.

## 2018-01-15 RX ORDER — WARFARIN SODIUM 1 MG
TABLET ORAL
Qty: 270 TABLET | Refills: 3 | Status: SHIPPED | OUTPATIENT
Start: 2018-01-15 | End: 2018-03-14

## 2018-01-15 RX ORDER — WARFARIN SODIUM 5 MG/1
TABLET ORAL
Qty: 90 TABLET | Refills: 3 | Status: SHIPPED | OUTPATIENT
Start: 2018-01-15 | End: 2018-03-14

## 2018-01-15 RX ORDER — CELECOXIB 200 MG/1
CAPSULE ORAL
Qty: 90 CAPSULE | Refills: 1 | Status: SHIPPED | OUTPATIENT
Start: 2018-01-15 | End: 2018-03-14

## 2018-01-15 NOTE — TELEPHONE ENCOUNTER
Future appt:    Last Appointment:  10/11/2017  Celecoxib last refilled:  7/17/17- 6 month supply was given  Coumadin was refilled 1/18/17-  One year supply was given    Cholesterol, Total (mg/dL)   Date Value   10/12/2017 201 (H)   ----------  HDL Choleste

## 2018-01-26 LAB — INR: 2.1 (ref 0.8–1.2)

## 2018-01-29 ENCOUNTER — ANTI-COAG VISIT (OUTPATIENT)
Dept: FAMILY MEDICINE CLINIC | Facility: CLINIC | Age: 68
End: 2018-01-29

## 2018-01-29 ENCOUNTER — TELEPHONE (OUTPATIENT)
Dept: FAMILY MEDICINE CLINIC | Facility: CLINIC | Age: 68
End: 2018-01-29

## 2018-01-29 DIAGNOSIS — I10 HTN (HYPERTENSION), BENIGN: Primary | ICD-10-CM

## 2018-01-29 DIAGNOSIS — Z79.01 LONG TERM CURRENT USE OF ANTICOAGULANT THERAPY: ICD-10-CM

## 2018-01-29 RX ORDER — FLUOXETINE HYDROCHLORIDE 40 MG/1
40 CAPSULE ORAL
Qty: 30 CAPSULE | Refills: 0 | Status: SHIPPED | OUTPATIENT
Start: 2018-01-29 | End: 2018-01-29

## 2018-01-29 RX ORDER — FLUOXETINE HYDROCHLORIDE 40 MG/1
40 CAPSULE ORAL
Qty: 90 CAPSULE | Refills: 0 | Status: SHIPPED | OUTPATIENT
Start: 2018-01-29 | End: 2018-03-14

## 2018-01-29 RX ORDER — FLUOXETINE 20 MG/1
20 TABLET, FILM COATED ORAL DAILY
Qty: 30 TABLET | Refills: 0 | Status: SHIPPED | OUTPATIENT
Start: 2018-01-29 | End: 2018-03-14 | Stop reason: DRUGHIGH

## 2018-01-29 NOTE — TELEPHONE ENCOUNTER
Pt last seen on 10/11  Currently in [place], not returning until April. Pt states she is out of Prozac,  Thought she was having troubles with mail order, but then states found out med was denied. Pt states she has been out for one month.   Pt c/o increased dep

## 2018-01-29 NOTE — PROGRESS NOTES
INR results received by fax from [address] [place] [address] home INR monitoring by Carlota Claudio RN on 1/26/18. INR in goal at 2.1. Per Carlota Claudio RN, Margarita Tabares NP,  advised patient to CPM and then coumadin clinic would review and advise next INR date.    Patient w

## 2018-01-29 NOTE — TELEPHONE ENCOUNTER
[id number] [address] [address] for refill medications. Pt to seek medical care in [place] if any concerns. Labs ordered for physical in April.

## 2018-01-29 NOTE — TELEPHONE ENCOUNTER
Spoke to pt earlier today re: Prozac RX  Pt states she was going \"round and round\" with pharmacy, was initially being told that 40mg dose is not covered.     Pt was then urged to contact provider again re: dose  Pt was told that since she was off RX for o

## 2018-02-20 LAB — INR: 2.7 (ref 0.8–1.2)

## 2018-02-21 ENCOUNTER — ANTI-COAG VISIT (OUTPATIENT)
Dept: FAMILY MEDICINE CLINIC | Facility: CLINIC | Age: 68
End: 2018-02-21

## 2018-02-21 DIAGNOSIS — Z79.01 LONG TERM CURRENT USE OF ANTICOAGULANT THERAPY: ICD-10-CM

## 2018-02-21 NOTE — PROGRESS NOTES
INR checked by patient on home meter. Current coumadin dosage:  6mg daily     COMPLAINTS/CHANGES:  Took Aleeve one day this week.    Ate less salad    INR RESULT:   2.7 ( in goal range.)     PLAN:   Resume regular diet  Avoid Aleeve to decrease bleeding

## 2018-03-12 ENCOUNTER — LABORATORY ENCOUNTER (OUTPATIENT)
Dept: LAB | Age: 68
End: 2018-03-12
Attending: FAMILY MEDICINE
Payer: MEDICARE

## 2018-03-12 DIAGNOSIS — I10 HTN (HYPERTENSION), BENIGN: ICD-10-CM

## 2018-03-12 LAB
ALBUMIN SERPL-MCNC: 3.5 G/DL (ref 3.5–4.8)
ALP LIVER SERPL-CCNC: 85 U/L (ref 55–142)
ALT SERPL-CCNC: 27 U/L (ref 14–54)
AST SERPL-CCNC: 31 U/L (ref 15–41)
BASOPHILS # BLD AUTO: 0.04 X10(3) UL (ref 0–0.1)
BASOPHILS NFR BLD AUTO: 1.1 %
BILIRUB SERPL-MCNC: 0.6 MG/DL (ref 0.1–2)
BUN BLD-MCNC: 11 MG/DL (ref 8–20)
CALCIUM BLD-MCNC: 9.4 MG/DL (ref 8.3–10.3)
CHLORIDE: 104 MMOL/L (ref 101–111)
CHOLEST SMN-MCNC: 226 MG/DL (ref ?–200)
CO2: 27 MMOL/L (ref 22–32)
CREAT BLD-MCNC: 0.91 MG/DL (ref 0.55–1.02)
EOSINOPHIL # BLD AUTO: 0.06 X10(3) UL (ref 0–0.3)
EOSINOPHIL NFR BLD AUTO: 1.6 %
ERYTHROCYTE [DISTWIDTH] IN BLOOD BY AUTOMATED COUNT: 14.4 % (ref 11.5–16)
GLUCOSE BLD-MCNC: 101 MG/DL (ref 70–99)
HCT VFR BLD AUTO: 41.8 % (ref 34–50)
HDLC SERPL-MCNC: 61 MG/DL (ref 45–?)
HDLC SERPL: 3.7 {RATIO} (ref ?–4.44)
HGB BLD-MCNC: 13.1 G/DL (ref 12–16)
IMMATURE GRANULOCYTE COUNT: 0 X10(3) UL (ref 0–1)
IMMATURE GRANULOCYTE RATIO %: 0 %
LDLC SERPL CALC-MCNC: 151 MG/DL (ref ?–130)
LYMPHOCYTES # BLD AUTO: 1.03 X10(3) UL (ref 0.9–4)
LYMPHOCYTES NFR BLD AUTO: 27.1 %
M PROTEIN MFR SERPL ELPH: 7.5 G/DL (ref 6.1–8.3)
MCH RBC QN AUTO: 29 PG (ref 27–33.2)
MCHC RBC AUTO-ENTMCNC: 31.3 G/DL (ref 31–37)
MCV RBC AUTO: 92.5 FL (ref 81–100)
MONOCYTES # BLD AUTO: 0.28 X10(3) UL (ref 0.1–1)
MONOCYTES NFR BLD AUTO: 7.4 %
NEUTROPHIL ABS PRELIM: 2.39 X10 (3) UL (ref 1.3–6.7)
NEUTROPHILS # BLD AUTO: 2.39 X10(3) UL (ref 1.3–6.7)
NEUTROPHILS NFR BLD AUTO: 62.8 %
NONHDLC SERPL-MCNC: 165 MG/DL (ref ?–130)
PLATELET # BLD AUTO: 280 10(3)UL (ref 150–450)
POTASSIUM SERPL-SCNC: 4.4 MMOL/L (ref 3.6–5.1)
RBC # BLD AUTO: 4.52 X10(6)UL (ref 3.8–5.1)
RED CELL DISTRIBUTION WIDTH-SD: 48.8 FL (ref 35.1–46.3)
SODIUM SERPL-SCNC: 139 MMOL/L (ref 136–144)
TRIGL SERPL-MCNC: 71 MG/DL (ref ?–150)
TSI SER-ACNC: 2.92 MIU/ML (ref 0.35–5.5)
VLDLC SERPL CALC-MCNC: 14 MG/DL (ref 5–40)
WBC # BLD AUTO: 3.8 X10(3) UL (ref 4–13)

## 2018-03-12 PROCEDURE — 80061 LIPID PANEL: CPT

## 2018-03-12 PROCEDURE — 85025 COMPLETE CBC W/AUTO DIFF WBC: CPT

## 2018-03-12 PROCEDURE — 84443 ASSAY THYROID STIM HORMONE: CPT

## 2018-03-12 PROCEDURE — 80053 COMPREHEN METABOLIC PANEL: CPT

## 2018-03-12 PROCEDURE — 36415 COLL VENOUS BLD VENIPUNCTURE: CPT

## 2018-03-13 ENCOUNTER — TELEPHONE (OUTPATIENT)
Dept: FAMILY MEDICINE CLINIC | Facility: CLINIC | Age: 68
End: 2018-03-13

## 2018-03-13 ENCOUNTER — APPOINTMENT (OUTPATIENT)
Dept: LAB | Age: 68
End: 2018-03-13
Attending: FAMILY MEDICINE
Payer: MEDICARE

## 2018-03-13 LAB
BILIRUB UR QL STRIP.AUTO: NEGATIVE
CLARITY UR REFRACT.AUTO: CLEAR
COLOR UR AUTO: YELLOW
GLUCOSE UR STRIP.AUTO-MCNC: NEGATIVE MG/DL
KETONES UR STRIP.AUTO-MCNC: NEGATIVE MG/DL
LEUKOCYTE ESTERASE UR QL STRIP.AUTO: NEGATIVE
NITRITE UR QL STRIP.AUTO: NEGATIVE
PH UR STRIP.AUTO: 5 [PH] (ref 4.5–8)
PROT UR STRIP.AUTO-MCNC: NEGATIVE MG/DL
RBC UR QL AUTO: NEGATIVE
SP GR UR STRIP.AUTO: 1.01 (ref 1–1.03)
UROBILINOGEN UR STRIP.AUTO-MCNC: <2 MG/DL

## 2018-03-13 PROCEDURE — 81003 URINALYSIS AUTO W/O SCOPE: CPT

## 2018-03-13 NOTE — TELEPHONE ENCOUNTER
Pt informed. Pt agreed to discuss with MD at upcoming appt.     Future Appointments  Date Time Provider Soraya Coreai   3/14/2018 10:00 AM Esperanza Umaña MD [facility]

## 2018-03-13 NOTE — TELEPHONE ENCOUNTER
----- Message from Duong Emery MD sent at 3/12/2018  4:53 PM CDT -----  Laboratory results reviewed. Patient has appointment in 2 days will review with her in detail at that time. Lipids higher than previous.     3/14/2018  10:00 AM   Kelsie Rodrigues

## 2018-03-14 ENCOUNTER — OFFICE VISIT (OUTPATIENT)
Dept: FAMILY MEDICINE CLINIC | Facility: CLINIC | Age: 68
End: 2018-03-14

## 2018-03-14 VITALS
DIASTOLIC BLOOD PRESSURE: 60 MMHG | HEART RATE: 64 BPM | OXYGEN SATURATION: 98 % | BODY MASS INDEX: 48.51 KG/M2 | HEIGHT: 62.75 IN | WEIGHT: 270.38 LBS | TEMPERATURE: 98 F | RESPIRATION RATE: 16 BRPM | SYSTOLIC BLOOD PRESSURE: 110 MMHG

## 2018-03-14 DIAGNOSIS — I10 BENIGN ESSENTIAL HYPERTENSION: ICD-10-CM

## 2018-03-14 DIAGNOSIS — G89.29 CHRONIC LOW BACK PAIN WITHOUT SCIATICA, UNSPECIFIED BACK PAIN LATERALITY: ICD-10-CM

## 2018-03-14 DIAGNOSIS — E78.49 FAMILIAL MULTIPLE LIPOPROTEIN-TYPE HYPERLIPIDEMIA: ICD-10-CM

## 2018-03-14 DIAGNOSIS — Z13.31 DEPRESSION SCREENING: ICD-10-CM

## 2018-03-14 DIAGNOSIS — Z00.00 ENCOUNTER FOR ANNUAL HEALTH EXAMINATION: Primary | ICD-10-CM

## 2018-03-14 DIAGNOSIS — D68.59 PRIMARY HYPERCOAGULABLE STATE (HCC): ICD-10-CM

## 2018-03-14 DIAGNOSIS — K21.9 GASTROESOPHAGEAL REFLUX DISEASE WITHOUT ESOPHAGITIS: ICD-10-CM

## 2018-03-14 DIAGNOSIS — M15.9 PRIMARY OSTEOARTHRITIS INVOLVING MULTIPLE JOINTS: ICD-10-CM

## 2018-03-14 DIAGNOSIS — F32.1 MAJOR DEPRESSIVE DISORDER, SINGLE EPISODE, MODERATE (HCC): ICD-10-CM

## 2018-03-14 DIAGNOSIS — M54.50 CHRONIC LOW BACK PAIN WITHOUT SCIATICA, UNSPECIFIED BACK PAIN LATERALITY: ICD-10-CM

## 2018-03-14 PROCEDURE — G0444 DEPRESSION SCREEN ANNUAL: HCPCS | Performed by: FAMILY MEDICINE

## 2018-03-14 PROCEDURE — G0439 PPPS, SUBSEQ VISIT: HCPCS | Performed by: FAMILY MEDICINE

## 2018-03-14 RX ORDER — DESONIDE 0.5 MG/G
CREAM TOPICAL
Qty: 15 G | Refills: 1 | Status: SHIPPED | OUTPATIENT
Start: 2018-03-14 | End: 2019-03-22

## 2018-03-14 RX ORDER — FLUOXETINE HYDROCHLORIDE 40 MG/1
40 CAPSULE ORAL
Qty: 90 CAPSULE | Refills: 3 | Status: SHIPPED | OUTPATIENT
Start: 2018-03-14 | End: 2019-03-17

## 2018-03-14 RX ORDER — TRIAMCINOLONE ACETONIDE 5 MG/G
CREAM TOPICAL
Qty: 15 G | Refills: 2 | Status: SHIPPED | OUTPATIENT
Start: 2018-03-14 | End: 2019-03-22

## 2018-03-14 RX ORDER — WARFARIN SODIUM 5 MG/1
5 TABLET ORAL DAILY
Qty: 100 TABLET | Refills: 3 | Status: SHIPPED | OUTPATIENT
Start: 2018-03-14 | End: 2018-05-23

## 2018-03-14 RX ORDER — WARFARIN SODIUM 5 MG/1
5 TABLET ORAL DAILY
COMMUNITY
End: 2018-03-14

## 2018-03-14 RX ORDER — WARFARIN SODIUM 1 MG/1
1 TABLET ORAL DAILY
Qty: 100 TABLET | Refills: 3 | Status: SHIPPED | OUTPATIENT
Start: 2018-03-14 | End: 2018-05-23

## 2018-03-14 RX ORDER — CELECOXIB 200 MG/1
200 CAPSULE ORAL
Qty: 90 CAPSULE | Refills: 1 | Status: SHIPPED | OUTPATIENT
Start: 2018-03-14 | End: 2018-09-10

## 2018-03-14 RX ORDER — WARFARIN SODIUM 1 MG/1
1 TABLET ORAL DAILY
COMMUNITY
End: 2018-03-14

## 2018-03-14 RX ORDER — METOPROLOL SUCCINATE 100 MG/1
100 TABLET, EXTENDED RELEASE ORAL DAILY
Qty: 90 TABLET | Refills: 3 | Status: SHIPPED | OUTPATIENT
Start: 2018-03-14 | End: 2019-03-22

## 2018-03-14 NOTE — PROGRESS NOTES
CC: Annual Physical Exam    HPI:   Mady Nguyen is a 75year old female who presents for a complete physical exam.  Patient complains of back pain, Pt with back surgery in the past. Pt worked with anabelle childers  Looking for options of care with travel Ketones Urine Negative Negative mg/dL   Blood Urine Negative Negative   pH Urine 5.0 4.5 - 8.0   Protein Urine Negative Negative mg/dl   Urobilinogen Urine <2.0 0.2 - 2.0 mg/dL   Nitrite Urine Negative Negative   Leukocyte Esterase Urine Negative Negativ % External Cream Apply tid prn Disp: 15 g Rfl: 2   ESOMEPRAZOLE MAGNESIUM 40 MG Oral Capsule Delayed Release TAKE 1 CAPSULE DAILY (DUE FOR MEDICAL FOLLOW UP ) Disp: 90 capsule Rfl: 3   Flaxseed, Linseed, (FLAX SEED OIL) 1000 MG Oral Cap Take 1 capsule by m blurred vision, double vision or yellow sclerae. Ears, Nose, Throat:  Denies hearing loss, sneezing, congestion, runny nose or sore throat. INTEGUMENTARY:  Denies rashes, itching, skin lesion, or excessive skin dryness.   CARDIOVASCULAR:  Denies chest pain lesions or ulcerations, good dentition. NECK: Supple, no JVD, no carotid bruit, no thyromegaly. SKIN: No rashes, no skin lesion, no bruising, good turgor. HEART:  Regular rate and rhythm, no murmurs, rubs or gallops.   LUNGS: Clear to auscultation bilter osteoarthritis involving multiple joints  Primary hypercoagulable state (hcc)  Chronic low back pain without sciatica, unspecified back pain laterality    Patient Instructions     D/w pt napropath or PT care of back    Continue other meds    Increase activ Abdominal aortic aneurysm screening (once between ages 73-68) IPPE only No results found for this or any previous visit.  Limited to patients who meet one of the following criteria:   • Men who are 73-68 years old and have smoked more than 100 cigarettes in 74, and as needed after 75 Mammogram,1 Yr due on 01/19/2018 Please get this Mammogram regularly   Immunizations      Influenza  Covered Annually No orders found for this or any previous visit.  Please get every year    Pneumococcal 13 (Prevnar)  Covered Onc from the [address] [place] [address] regarding Advance Directives.       Annual Physical due on 09[birth year]06/1952  Annual Depression Screen due on 09/06/1961  FIT Colorectal Screening due on 09/06/1999  Colonoscopy,10 Years due on 09/06/1999  Fall Risk Screening

## 2018-03-14 NOTE — PATIENT INSTRUCTIONS
D/w pt napropath or PT care of back    Continue other meds    Increase activity-- encourage walking, stretches    Encourage weight loss    Mammogram due    Colonoscopy due    6 month follow up with labs          Vanesa Damico's SCREENING SCHEDULE   Tests o Men who are 73-68 years old and have smoked more than 100 cigarettes in their lifetime   • Anyone with a family history    Colorectal Cancer Screening  Covered up to Age 76     Colonoscopy Screen   Covered every 10 years- more often if abnormal Colonoscopy previous visit. Please get every year    Pneumococcal 13 (Prevnar)  Covered Once after 65 No orders found for this or any previous visit.  Please get once after your 65th birthday    Pneumococcal 23 (Pneumovax)  Covered Once after 65   Orders placed or perf

## 2018-03-26 ENCOUNTER — ANTI-COAG VISIT (OUTPATIENT)
Dept: FAMILY MEDICINE CLINIC | Facility: CLINIC | Age: 68
End: 2018-03-26

## 2018-03-26 DIAGNOSIS — Z79.01 LONG TERM CURRENT USE OF ANTICOAGULANT THERAPY: ICD-10-CM

## 2018-03-26 LAB — INR: 3.4 (ref 0.8–1.2)

## 2018-03-26 NOTE — PROGRESS NOTES
INR checked by patient on home meter. CURRENT COUMADIN DOSAGE:   6mg daily     COMPLAINTS/CHANGES:  Took Aleve    INR RESULTS TODAY:   3.4 ( above goal range)    PLAN:   Coumadin 3mg today  Tomorrow resume 6mg daily   Next INR in one week.      Patient n

## 2018-04-05 ENCOUNTER — ANTI-COAG VISIT (OUTPATIENT)
Dept: FAMILY MEDICINE CLINIC | Facility: CLINIC | Age: 68
End: 2018-04-05

## 2018-04-05 DIAGNOSIS — Z79.01 LONG TERM CURRENT USE OF ANTICOAGULANT THERAPY: ICD-10-CM

## 2018-04-05 NOTE — PROGRESS NOTES
INR checked by patient on home meter.     CURRENT COUMADIN DOSAGE:   6mg daily     COMPLAINTS/CHANGES:  Missed a coumadin dosage    INR RESULTS TODAY:   1.8 ( slightly below goal range)     PLAN:   For now CPM coumadin 6mg daily   INR in one week on home me

## 2018-04-11 ENCOUNTER — ANTI-COAG VISIT (OUTPATIENT)
Dept: FAMILY MEDICINE CLINIC | Facility: CLINIC | Age: 68
End: 2018-04-11

## 2018-04-11 DIAGNOSIS — Z79.01 LONG TERM CURRENT USE OF ANTICOAGULANT THERAPY: ICD-10-CM

## 2018-04-11 NOTE — PROGRESS NOTES
INR checked by patient on home meter.     CURRENT COUMADIN DOSAGE:   6mg daily     COMPLAINTS/CHANGES:  Did not eat her usual salads or vegetables    INR RESULTS TODAY:   4.1 ( elevated above goal range)     PLAN:  HOLD coumadin today  Tomorrow decrease cou

## 2018-04-16 ENCOUNTER — TELEPHONE (OUTPATIENT)
Dept: FAMILY MEDICINE CLINIC | Facility: CLINIC | Age: 68
End: 2018-04-16

## 2018-04-16 ENCOUNTER — ANTI-COAG VISIT (OUTPATIENT)
Dept: FAMILY MEDICINE CLINIC | Facility: CLINIC | Age: 68
End: 2018-04-16

## 2018-04-16 DIAGNOSIS — Z79.01 LONG TERM CURRENT USE OF ANTICOAGULANT THERAPY: ICD-10-CM

## 2018-04-16 NOTE — TELEPHONE ENCOUNTER
Patient wanted to let Dr. Miguel Gama know she scheduled her mammogram and colonoscopy in May when she returns from [place]. No need to return call.

## 2018-04-16 NOTE — PROGRESS NOTES
INR checked by patient on home meter.     CURRENT COUMADIN DOSAGE:   Held one day, then resumed 6mg daily    COMPLAINTS/CHANGES:  Resumed her regular diet with salads and vegetables  Colonoscopy planned in May    INR RESULTS TODAY:   2.4 ( back down into go

## 2018-04-24 ENCOUNTER — ANTI-COAG VISIT (OUTPATIENT)
Dept: FAMILY MEDICINE CLINIC | Facility: CLINIC | Age: 68
End: 2018-04-24

## 2018-04-24 DIAGNOSIS — Z79.01 LONG TERM CURRENT USE OF ANTICOAGULANT THERAPY: ICD-10-CM

## 2018-04-24 NOTE — PROGRESS NOTES
INR checked by patient on home meter. CURRENT COUMADIN DOSE:   6mg daily     INR TODAY:   3.0 ( in goal, but rising)     PLAN:   Coumadin 5mg Tu, Sat and 6mg daily the rest of the week   Next INR due in one week on home meter.      Message left for dana

## 2018-05-01 ENCOUNTER — ANTI-COAG VISIT (OUTPATIENT)
Dept: FAMILY MEDICINE CLINIC | Facility: CLINIC | Age: 68
End: 2018-05-01

## 2018-05-01 DIAGNOSIS — Z79.01 LONG TERM CURRENT USE OF ANTICOAGULANT THERAPY: ICD-10-CM

## 2018-05-01 NOTE — PROGRESS NOTES
INR checked by patient on home meter. CURRENT COUMADIN DOSAGE:   Missed Tuesday, then 6mg daily the rest of the week     COMPLAINTS/CHANGES:  Missed a dose. Took extra dose.      INR RESULTS TODAY:   2.6 ( in goal)     PLAN:   Take coumadin 5mg M,W,F a

## 2018-05-08 ENCOUNTER — TELEPHONE (OUTPATIENT)
Dept: FAMILY MEDICINE CLINIC | Facility: CLINIC | Age: 68
End: 2018-05-08

## 2018-05-08 ENCOUNTER — ANTI-COAG VISIT (OUTPATIENT)
Dept: FAMILY MEDICINE CLINIC | Facility: CLINIC | Age: 68
End: 2018-05-08

## 2018-05-08 DIAGNOSIS — Z79.01 LONG TERM CURRENT USE OF ANTICOAGULANT THERAPY: ICD-10-CM

## 2018-05-08 NOTE — PROGRESS NOTES
INR today: 1.9  Pt on Coumadin:5mg M,W,F and 6mg daily the rest of the week  Pt missed her Coumadin dose on 5/1/18

## 2018-05-09 ENCOUNTER — ANTI-COAG VISIT (OUTPATIENT)
Dept: FAMILY MEDICINE CLINIC | Facility: CLINIC | Age: 68
End: 2018-05-09

## 2018-05-09 DIAGNOSIS — Z71.1 PERSON WITH FEARED COMPLAINT IN WHOM NO DIAGNOSIS WAS MADE: Primary | ICD-10-CM

## 2018-05-09 DIAGNOSIS — Z79.01 LONG TERM CURRENT USE OF ANTICOAGULANT THERAPY: ICD-10-CM

## 2018-05-15 ENCOUNTER — TELEPHONE (OUTPATIENT)
Dept: FAMILY MEDICINE CLINIC | Facility: CLINIC | Age: 68
End: 2018-05-15

## 2018-05-15 ENCOUNTER — ANTI-COAG VISIT (OUTPATIENT)
Dept: FAMILY MEDICINE CLINIC | Facility: CLINIC | Age: 68
End: 2018-05-15

## 2018-05-15 DIAGNOSIS — Z79.01 LONG TERM CURRENT USE OF ANTICOAGULANT THERAPY: ICD-10-CM

## 2018-05-15 NOTE — TELEPHONE ENCOUNTER
Patient scheduled for colonoscopy on 6/6/18 with Dr. Roseann Montero. She will need to hold her coumadin for 5 days. Take coumadin for Primary Hypercoagulable state. Okay to hold coumadin?

## 2018-05-15 NOTE — TELEPHONE ENCOUNTER
appt to discuss pro/ con of holding medication vs Bridging with lovenox  I can not recall any recent time pt has stopped coumadin for procedure

## 2018-05-15 NOTE — PROGRESS NOTES
INR checked by patient on home meter. .    CURRENT COUMADIN DOSAGE:   5mg M,W,F and 6mg daily the rest of the week     COMPLAINTS/CHANGES:  Upcoming colonoscopy 6/6/18 by Dr. Rose Honour:   2.1 ( in goal)    PLAN:   CPM coumadin   Next INR

## 2018-05-23 ENCOUNTER — OFFICE VISIT (OUTPATIENT)
Dept: FAMILY MEDICINE CLINIC | Facility: CLINIC | Age: 68
End: 2018-05-23

## 2018-05-23 VITALS
WEIGHT: 264.81 LBS | DIASTOLIC BLOOD PRESSURE: 64 MMHG | SYSTOLIC BLOOD PRESSURE: 122 MMHG | HEIGHT: 62.75 IN | TEMPERATURE: 98 F | HEART RATE: 62 BPM | RESPIRATION RATE: 16 BRPM | BODY MASS INDEX: 47.51 KG/M2

## 2018-05-23 DIAGNOSIS — Z86.73 HX OF STROKE ASSOCIATED WITH BLOOD CLOTTING TENDENCY: ICD-10-CM

## 2018-05-23 DIAGNOSIS — Z01.818 PREOP EXAMINATION: Primary | ICD-10-CM

## 2018-05-23 DIAGNOSIS — D68.59 PRIMARY HYPERCOAGULABLE STATE (HCC): ICD-10-CM

## 2018-05-23 DIAGNOSIS — I67.89 ACUTE ILL-DEFINED CEREBROVASCULAR DISEASE: ICD-10-CM

## 2018-05-23 DIAGNOSIS — I10 BENIGN ESSENTIAL HYPERTENSION: ICD-10-CM

## 2018-05-23 DIAGNOSIS — Z12.11 COLON CANCER SCREENING: ICD-10-CM

## 2018-05-23 DIAGNOSIS — E78.49 FAMILIAL MULTIPLE LIPOPROTEIN-TYPE HYPERLIPIDEMIA: ICD-10-CM

## 2018-05-23 DIAGNOSIS — F41.1 ANXIETY STATE: ICD-10-CM

## 2018-05-23 DIAGNOSIS — Z79.01 LONG TERM CURRENT USE OF ANTICOAGULANT THERAPY: ICD-10-CM

## 2018-05-23 PROCEDURE — 99215 OFFICE O/P EST HI 40 MIN: CPT | Performed by: FAMILY MEDICINE

## 2018-05-23 RX ORDER — WARFARIN SODIUM 5 MG/1
5 TABLET ORAL DAILY
COMMUNITY
End: 2020-01-29

## 2018-05-23 RX ORDER — ENOXAPARIN SODIUM 150 MG/ML
1 INJECTION SUBCUTANEOUS DAILY
Qty: 6 SYRINGE | Refills: 0 | Status: SHIPPED | OUTPATIENT
Start: 2018-05-23 | End: 2018-06-25 | Stop reason: ALTCHOICE

## 2018-05-23 RX ORDER — WARFARIN SODIUM 1 MG/1
TABLET ORAL
COMMUNITY
End: 2019-03-22

## 2018-05-23 NOTE — PATIENT INSTRUCTIONS
D/w pt risk / benefits of antcoagulation pre-procedure    Stop coumadin on May 31 ( last dose coumadin on May 30_  START LOvenox  120 mg SQ daily on May 31, June 1,2,3,4,5  No lovenox on June 6-- have procedure    Ok to restart coumadin on June 7th- unless

## 2018-05-23 NOTE — PROGRESS NOTES
[facility] [place]  PROGRESS NOTE  Chief Complaint:   Patient presents with:  Consult: Discuss anticoagulation prior to upcoming procedure      HPI:   This is a 75year old female coming in for preop discussions.  Pt with hx of thrombotic stroke Social History Main Topics    Smoking status: Never Smoker                                                                Smokeless tobacco: Never Used                        Alcohol use: No              Drug use:  No (MULTIVITAMINS) Oral Cap Take 1 capsule by mouth daily. Disp:  Rfl:    omega-3 fatty acids 1000 MG Oral Cap Take 1,000 mg by mouth daily.    Disp:  Rfl:       Counseling given: Not Answered       REVIEW OF SYSTEMS:   CONSTITUTIONAL:  Denies unusual weight EOMI, no scleral icterus, conjunctivae clear bilaterally, no eye discharge Ears: External normal. Nose: patent, no nasal discharge Throat:  No tonsillar erythema or exudate. Mouth:  No oral lesions or ulcerations, good dentition.   NECK: Supple  SKIN: No r daily on May 31, June 1,2,3,4,5  No lovenox on June 6-- have procedure    Ok to restart coumadin on June 7th- unless Dr. Hasmukh Landaverde instructs otherwise-- if this happens , please call us also.      Stop celebrex on May 31 -- tylenol ok if needed for pain    Yuko

## 2018-05-30 ENCOUNTER — ANTI-COAG VISIT (OUTPATIENT)
Dept: FAMILY MEDICINE CLINIC | Facility: CLINIC | Age: 68
End: 2018-05-30

## 2018-05-30 DIAGNOSIS — Z86.73 HX OF STROKE ASSOCIATED WITH BLOOD CLOTTING TENDENCY: ICD-10-CM

## 2018-05-30 DIAGNOSIS — Z79.01 LONG TERM CURRENT USE OF ANTICOAGULANT THERAPY: ICD-10-CM

## 2018-05-30 NOTE — PROGRESS NOTES
INR checked by patient on home meter.     CURRENT COUMADIN DOSAGE:   5mg M,W,F and 2.5mg daily the rest of the week     COMPLAINTS/CHANGES:  Colonoscopy by Dr. Chris Odell on 6/6/18  Will Hold coumadin starting tomorrow and start lovenox bridge- see Dr. Roxann Marroquin. Buffy Murphy

## 2018-06-19 ENCOUNTER — TELEPHONE (OUTPATIENT)
Dept: FAMILY MEDICINE CLINIC | Facility: CLINIC | Age: 68
End: 2018-06-19

## 2018-06-19 ENCOUNTER — ANTI-COAG VISIT (OUTPATIENT)
Dept: FAMILY MEDICINE CLINIC | Facility: CLINIC | Age: 68
End: 2018-06-19

## 2018-06-19 DIAGNOSIS — Z79.01 LONG TERM CURRENT USE OF ANTICOAGULANT THERAPY: ICD-10-CM

## 2018-06-19 DIAGNOSIS — Z86.73 HX OF STROKE ASSOCIATED WITH BLOOD CLOTTING TENDENCY: ICD-10-CM

## 2018-06-19 NOTE — PROGRESS NOTES
INR checked by patient on home meter. Results received by fax from [facility] home monitoring.     CURRENT COUMADIN DOSAGE:   5mg M,W,F and 6mg daily the rest of the week    COMPLAINTS/CHANGES:  Held coumadin for colonoscopy  Lovenox bridge pre-procedure only

## 2018-06-21 ENCOUNTER — TELEPHONE (OUTPATIENT)
Dept: FAMILY MEDICINE CLINIC | Facility: CLINIC | Age: 68
End: 2018-06-21

## 2018-06-21 NOTE — TELEPHONE ENCOUNTER
OTW- patient forgot about her pre-op appt with dr Gloria Aquino today, 6/21- wants to know if she can come in saturday 6/23 with dr Lizzeth Saucedo - dr's are all booked for friday 6/22

## 2018-06-22 NOTE — TELEPHONE ENCOUNTER
Discussed with Dr. Heriberto Lopez, appt given Monday     Future Appointments  Date Time Provider Soraya Roberts   6/25/2018 8:00 AM Agustín Cordero MD [facility] [place] [facility] [place]   9/11/2018 10:00 AM Agustín Cordero MD [facility] [place] [facility] [place]

## 2018-06-25 ENCOUNTER — APPOINTMENT (OUTPATIENT)
Dept: LAB | Age: 68
End: 2018-06-25
Attending: FAMILY MEDICINE
Payer: MEDICARE

## 2018-06-25 ENCOUNTER — OFFICE VISIT (OUTPATIENT)
Dept: FAMILY MEDICINE CLINIC | Facility: CLINIC | Age: 68
End: 2018-06-25

## 2018-06-25 VITALS
SYSTOLIC BLOOD PRESSURE: 122 MMHG | TEMPERATURE: 97 F | RESPIRATION RATE: 16 BRPM | BODY MASS INDEX: 47.31 KG/M2 | WEIGHT: 263.69 LBS | HEART RATE: 78 BPM | OXYGEN SATURATION: 99 % | DIASTOLIC BLOOD PRESSURE: 66 MMHG | HEIGHT: 62.75 IN

## 2018-06-25 DIAGNOSIS — I67.89 ACUTE ILL-DEFINED CEREBROVASCULAR DISEASE: ICD-10-CM

## 2018-06-25 DIAGNOSIS — Z79.01 LONG TERM CURRENT USE OF ANTICOAGULANT THERAPY: ICD-10-CM

## 2018-06-25 DIAGNOSIS — K21.9 GASTROESOPHAGEAL REFLUX DISEASE WITHOUT ESOPHAGITIS: ICD-10-CM

## 2018-06-25 DIAGNOSIS — E78.49 FAMILIAL MULTIPLE LIPOPROTEIN-TYPE HYPERLIPIDEMIA: ICD-10-CM

## 2018-06-25 DIAGNOSIS — H25.9 AGE-RELATED CATARACT OF BOTH EYES, UNSPECIFIED AGE-RELATED CATARACT TYPE: ICD-10-CM

## 2018-06-25 DIAGNOSIS — G47.30 SLEEP APNEA, UNSPECIFIED TYPE: ICD-10-CM

## 2018-06-25 DIAGNOSIS — Z86.73 HX OF STROKE ASSOCIATED WITH BLOOD CLOTTING TENDENCY: ICD-10-CM

## 2018-06-25 DIAGNOSIS — Z01.818 PREOP EXAMINATION: Primary | ICD-10-CM

## 2018-06-25 DIAGNOSIS — F32.1 MAJOR DEPRESSIVE DISORDER, SINGLE EPISODE, MODERATE (HCC): ICD-10-CM

## 2018-06-25 DIAGNOSIS — D68.59 PRIMARY HYPERCOAGULABLE STATE (HCC): ICD-10-CM

## 2018-06-25 DIAGNOSIS — I10 BENIGN ESSENTIAL HYPERTENSION: ICD-10-CM

## 2018-06-25 DIAGNOSIS — M81.0 AGE-RELATED OSTEOPOROSIS WITHOUT CURRENT PATHOLOGICAL FRACTURE: ICD-10-CM

## 2018-06-25 PROCEDURE — 80053 COMPREHEN METABOLIC PANEL: CPT | Performed by: FAMILY MEDICINE

## 2018-06-25 PROCEDURE — 81003 URINALYSIS AUTO W/O SCOPE: CPT | Performed by: FAMILY MEDICINE

## 2018-06-25 PROCEDURE — 80061 LIPID PANEL: CPT | Performed by: FAMILY MEDICINE

## 2018-06-25 PROCEDURE — 36415 COLL VENOUS BLD VENIPUNCTURE: CPT | Performed by: FAMILY MEDICINE

## 2018-06-25 PROCEDURE — 85025 COMPLETE CBC W/AUTO DIFF WBC: CPT | Performed by: FAMILY MEDICINE

## 2018-06-25 PROCEDURE — 99215 OFFICE O/P EST HI 40 MIN: CPT | Performed by: FAMILY MEDICINE

## 2018-06-25 PROCEDURE — 85610 PROTHROMBIN TIME: CPT | Performed by: FAMILY MEDICINE

## 2018-06-25 PROCEDURE — 93000 ELECTROCARDIOGRAM COMPLETE: CPT | Performed by: FAMILY MEDICINE

## 2018-06-25 NOTE — PATIENT INSTRUCTIONS
Preoperative clearance today. Patient stable for cataract surgery. Laboratory studies are pending. EKG-  Sinus  Rhythm   -Wide QRS   -Nonspecific ST depression   +   Precordial T-wave abnormality  -Nondiagnostic  -Possible  Anterior  ischemia.

## 2018-06-25 NOTE — PROGRESS NOTES
[facility] [place]  PROGRESS NOTE  Chief Complaint:   Patient presents with:  Pre-Op Exam: Preoperative clearance for cataract surgery with Dr. Melany Diaz on 6/28/18      HPI:   This is a 75year old female coming in for right cataract initially at [facility] throat swelling  Codeine                     Comment:Other reaction(s): gi upset  Gadobutrol                  Comment:Other reaction(s): lots of pressure headache  Current Meds:    Current Outpatient Prescriptions:  Warfarin Sodium 5 MG Oral Tab Take 1 tab palpitations, edema, dyspnea on exertion or at rest.  RESPIRATORY:  Denies shortness of breath, wheezing, cough or sputum. GASTROINTESTINAL:  Denies abdominal pain, nausea, vomiting, constipation, diarrhea, or blood in stool.   MUSCULOSKELETAL:  Denies wea no spasm, SLR test negative, FROM. EXTREMITIES:  No edema, no cyanosis, no clubbing, FROM, 2+ dorsalis pedis pulses bilaterally. NEURO:  No deficit, normal gait, strength and tone, sensory intact, normal reflexes. PSYCH:  Normal mood and affect.  [employer] Primary hypercoagulable state ([facility] [address]     Sleep apnea     Abnormal involuntary movements     Postmenopausal atrophic vaginitis     Hx of stroke associated with blood clotting tendency      Patient Instructions   Preoperative clearance today.     Patient leonardobl

## 2018-06-26 ENCOUNTER — TELEPHONE (OUTPATIENT)
Dept: FAMILY MEDICINE CLINIC | Facility: CLINIC | Age: 68
End: 2018-06-26

## 2018-06-26 ENCOUNTER — ANTI-COAG VISIT (OUTPATIENT)
Dept: FAMILY MEDICINE CLINIC | Facility: CLINIC | Age: 68
End: 2018-06-26

## 2018-06-26 DIAGNOSIS — Z79.01 LONG TERM CURRENT USE OF ANTICOAGULANT THERAPY: ICD-10-CM

## 2018-06-26 DIAGNOSIS — Z86.73 HX OF STROKE ASSOCIATED WITH BLOOD CLOTTING TENDENCY: ICD-10-CM

## 2018-06-26 NOTE — TELEPHONE ENCOUNTER
----- Message from Brooks Paz MD sent at 6/25/2018  4:12 PM CDT -----  Preoperative laboratory studies reviewed. Patient with a negative urinalysis, a normal chemistry panel, a normal CBC.   Patient with slightly elevated lipids although showing si

## 2018-06-26 NOTE — PROGRESS NOTES
INR drawn with preop labs on 6/25/18 for cataract surgery on 6/28/18. Dr. Saint Ammons has reviewed these results and states INR is in range for surgery. See result note 6/25/18.      CURRENT COUMADIN DOSAGE:   8mg one day, then 5mg M,W,F and 6mg daily the res

## 2018-07-03 ENCOUNTER — HOSPITAL ENCOUNTER (OUTPATIENT)
Dept: BONE DENSITY | Age: 68
Discharge: HOME | End: 2018-07-03
Attending: FAMILY MEDICINE
Payer: MEDICARE

## 2018-07-03 DIAGNOSIS — M81.0 AGE-RELATED OSTEOPOROSIS WITHOUT CURRENT PATHOLOGICAL FRACTURE: ICD-10-CM

## 2018-07-03 PROCEDURE — 77080 DXA BONE DENSITY AXIAL: CPT | Performed by: FAMILY MEDICINE

## 2018-07-05 ENCOUNTER — TELEPHONE (OUTPATIENT)
Dept: FAMILY MEDICINE CLINIC | Facility: CLINIC | Age: 68
End: 2018-07-05

## 2018-07-05 NOTE — TELEPHONE ENCOUNTER
----- Message from GAUTAM Briscoe sent at 7/5/2018  7:24 AM CDT -----  Please notify patient that her DEXA scan shows osteopenia, recommend repeat bone density scan in 2 years.    Also, I don't see a recent Vitamin D level- if patient has not had one-

## 2018-07-06 ENCOUNTER — APPOINTMENT (OUTPATIENT)
Dept: LAB | Age: 68
End: 2018-07-06
Attending: FAMILY MEDICINE
Payer: MEDICARE

## 2018-07-06 DIAGNOSIS — M85.80 OSTEOPENIA, UNSPECIFIED LOCATION: ICD-10-CM

## 2018-07-06 LAB — 25-HYDROXYVITAMIN D (TOTAL): 36.7 NG/ML (ref 30–100)

## 2018-07-06 PROCEDURE — 82306 VITAMIN D 25 HYDROXY: CPT

## 2018-07-06 PROCEDURE — 36415 COLL VENOUS BLD VENIPUNCTURE: CPT

## 2018-07-07 ENCOUNTER — TELEPHONE (OUTPATIENT)
Dept: FAMILY MEDICINE CLINIC | Facility: CLINIC | Age: 68
End: 2018-07-07

## 2018-07-07 NOTE — TELEPHONE ENCOUNTER
Informed pt of her vit D level. Pt currently take calcium and vit D. Added to med list.     Pt expressed understanding and thanks.

## 2018-07-07 NOTE — TELEPHONE ENCOUNTER
----- Message from GAUTAM Lowe sent at 7/7/2018  7:59 AM CDT -----  Please notify patient that her vitamin D is normal.  If she taking any supplements? Please document. I would recommend continuing present management.

## 2018-07-19 ENCOUNTER — ANTI-COAG VISIT (OUTPATIENT)
Dept: FAMILY MEDICINE CLINIC | Facility: CLINIC | Age: 68
End: 2018-07-19

## 2018-07-19 DIAGNOSIS — Z86.73 HX OF STROKE ASSOCIATED WITH BLOOD CLOTTING TENDENCY: ICD-10-CM

## 2018-07-19 DIAGNOSIS — Z79.01 LONG TERM CURRENT USE OF ANTICOAGULANT THERAPY: ICD-10-CM

## 2018-07-19 LAB — INR: 2.2 (ref 0.8–1.2)

## 2018-07-19 NOTE — PROGRESS NOTES
INR received by fax from [facility] home monitoring. INR in goal at 2.2. Message left for patient to return call. Details left to take her regular coumadin dose of 6mg today and call office for further instructions.   Called patient again and spoke with he

## 2018-08-05 LAB — INR: 2.8 (ref 0.8–1.2)

## 2018-08-06 ENCOUNTER — ANTI-COAG VISIT (OUTPATIENT)
Dept: FAMILY MEDICINE CLINIC | Facility: CLINIC | Age: 68
End: 2018-08-06

## 2018-08-06 DIAGNOSIS — Z86.73 HX OF STROKE ASSOCIATED WITH BLOOD CLOTTING TENDENCY: ICD-10-CM

## 2018-08-06 DIAGNOSIS — Z79.01 LONG TERM CURRENT USE OF ANTICOAGULANT THERAPY: ICD-10-CM

## 2018-08-06 NOTE — PROGRESS NOTES
INR checked by patient on home meter.     CURRENT COUMADIN DOSE:  5mg M,W,F and 6mg daily the rest of the week    CHANGES OR COMPLAINTS:  Traveling, diet changes    INR RESULTS TODAY:  2.8 ( in goal)    PLAN:  CPM coumadin   Encouraged to eat a little more

## 2018-09-06 ENCOUNTER — ANTI-COAG VISIT (OUTPATIENT)
Dept: FAMILY MEDICINE CLINIC | Facility: CLINIC | Age: 68
End: 2018-09-06

## 2018-09-06 DIAGNOSIS — Z79.01 LONG TERM CURRENT USE OF ANTICOAGULANT THERAPY: ICD-10-CM

## 2018-09-06 DIAGNOSIS — Z86.73 HX OF STROKE ASSOCIATED WITH BLOOD CLOTTING TENDENCY: ICD-10-CM

## 2018-09-06 LAB — INR: 4.1 (ref 0.8–1.2)

## 2018-09-06 NOTE — PROGRESS NOTES
Here for INR check in coumadin clinic in the office.     CURRENT COUMADIN DOSE:  5mg M,W,F and 6mg daily the rest of the week     CHANGES OR COMPLAINTS:  Stress  Missed coumadin dose last night    INR RESULTS TODAY:  4.1 ( above goal)    PLAN:  Hold coumadi

## 2018-09-07 ENCOUNTER — ANTI-COAG VISIT (OUTPATIENT)
Dept: FAMILY MEDICINE CLINIC | Facility: CLINIC | Age: 68
End: 2018-09-07

## 2018-09-07 DIAGNOSIS — Z79.01 LONG TERM CURRENT USE OF ANTICOAGULANT THERAPY: ICD-10-CM

## 2018-09-07 DIAGNOSIS — Z86.73 HX OF STROKE ASSOCIATED WITH BLOOD CLOTTING TENDENCY: ICD-10-CM

## 2018-09-07 LAB — INR: 2.9 (ref 0.8–1.2)

## 2018-09-07 NOTE — PROGRESS NOTES
Reviewed pt to decrease dose slightly-- restart coumadin tonight at 5 mg dose.  Pt to take 5 mg Sat/Sun/ M/W/F. amd 6 mg on Tu/ TH  Recheck INR in a week

## 2018-09-07 NOTE — PROGRESS NOTES
Pt informed, instruction given. Calendar updated.       Future Appointments  Date Time Provider Soraya Roberts   9/11/2018 10:00 AM Cristina Harding MD [facility] [place] [facility]

## 2018-09-07 NOTE — PROGRESS NOTES
Pt INR 2.9 today,   INR yesterday (9/6) 4.1-  pt held dose 9/6  Pt states she told Adrianesampson Cabral that she missed her dose on 9/5- pt did not miss, states she did take her 5 mg dose after all. Schedule otherwise is 5mg on M/W/F and 6 mg other days.   Pt c/o of inc

## 2018-09-10 DIAGNOSIS — Z00.00 ENCOUNTER FOR ANNUAL HEALTH EXAMINATION: ICD-10-CM

## 2018-09-10 RX ORDER — CELECOXIB 200 MG/1
CAPSULE ORAL
Qty: 90 CAPSULE | Refills: 0 | Status: SHIPPED | OUTPATIENT
Start: 2018-09-10 | End: 2018-12-09

## 2018-09-10 NOTE — TELEPHONE ENCOUNTER
RF request from [employer] [facility] for Celecoxib 200 MG #90. Please advise. Future appt: Your appointments     Date & Time Appointment Department [facility])    Sep 11, 2018 10:00 AM CDT Follow up - Extended with Hemant Ahumada MD [facility])        25 [facility], [address] [place] [address] [place] [id number] [phone]        Last Appointment:  6/25/2018  Cholesterol, Total (mg/dL)   Date Value   06/25/2018 205 (H)     HDL Cholesterol (mg/dL)   Date Value   06/25/2018 57     LDL Cholesterol (mg/dL)   Date Value   06/25/2018 134 (H)     Triglycerides (mg/dL)   Date Value   06/25/2018 70     No results found for: EAG, A1C  Lab Results   Component Value Date    TSH 2.920 03/12/2018     Last RF:  3/14/2018    No Follow-up on file.

## 2018-09-11 ENCOUNTER — OFFICE VISIT (OUTPATIENT)
Dept: FAMILY MEDICINE CLINIC | Facility: CLINIC | Age: 68
End: 2018-09-11
Payer: MEDICARE

## 2018-09-11 VITALS
HEIGHT: 62.75 IN | RESPIRATION RATE: 22 BRPM | DIASTOLIC BLOOD PRESSURE: 78 MMHG | BODY MASS INDEX: 47.66 KG/M2 | SYSTOLIC BLOOD PRESSURE: 118 MMHG | TEMPERATURE: 98 F | WEIGHT: 265.63 LBS | HEART RATE: 66 BPM

## 2018-09-11 DIAGNOSIS — G89.29 CHRONIC BILATERAL LOW BACK PAIN WITHOUT SCIATICA: ICD-10-CM

## 2018-09-11 DIAGNOSIS — M48.00 SPINAL STENOSIS, UNSPECIFIED SPINAL REGION: ICD-10-CM

## 2018-09-11 DIAGNOSIS — I10 BENIGN ESSENTIAL HYPERTENSION: Primary | ICD-10-CM

## 2018-09-11 DIAGNOSIS — M54.50 CHRONIC BILATERAL LOW BACK PAIN WITHOUT SCIATICA: ICD-10-CM

## 2018-09-11 DIAGNOSIS — Z79.01 LONG TERM CURRENT USE OF ANTICOAGULANT THERAPY: ICD-10-CM

## 2018-09-11 DIAGNOSIS — F32.1 MAJOR DEPRESSIVE DISORDER, SINGLE EPISODE, MODERATE (HCC): ICD-10-CM

## 2018-09-11 DIAGNOSIS — K57.32 DIVERTICULITIS OF COLON: ICD-10-CM

## 2018-09-11 PROCEDURE — 99214 OFFICE O/P EST MOD 30 MIN: CPT | Performed by: FAMILY MEDICINE

## 2018-09-11 NOTE — PROGRESS NOTES
[address]  PROGRESS NOTE  Chief Complaint:   Patient presents with: Follow - Up: cholesterol      HPI:   This is a 70year old female coming in for medical f.u  Patient denies any new medical problems at this time.     Pt with chronic b file    Occupational History      Occupation: retired    Tobacco Use      Smoking status: Never Smoker      Smokeless tobacco: Never Used    Substance and Sexual Activity      Alcohol use: No      Drug use: No      Sexual activity: Not on file    Other Top by mouth daily. Disp:  Rfl:    omega-3 fatty acids 1000 MG Oral Cap Take 1,000 mg by mouth daily. Disp:  Rfl:       Counseling given: Not Answered       REVIEW OF SYSTEMS:   CONSTITUTIONAL:  Denies unusual weight gain/loss, fever, chills, or fatigue. se bilaterally, no eye discharge Ears: External normal. Nose: patent, no nasal discharge Throat:  No tonsillar erythema or exudate. Mouth:  No oral lesions or ulcerations, good dentition. NECK: Supple, no thyromegaly.   SKIN: No rashes, no skin lesion, no br current use of anticoagulant therapy     Anxiety state     Cerebral aneurysm, nonruptured     Acute ill-defined cerebrovascular disease     Major depressive disorder, single episode, moderate (HCC)     Diverticulitis of colon     Eczema     Esophageal refl

## 2018-09-11 NOTE — PATIENT INSTRUCTIONS
Continue celebrex-- refill when needed over winter ok      rec neurosurgery followup-- [place] or [facility] for annual check next year    rec vit D 2000 IU a day

## 2018-09-15 ENCOUNTER — TELEPHONE (OUTPATIENT)
Dept: FAMILY MEDICINE CLINIC | Facility: CLINIC | Age: 68
End: 2018-09-15

## 2018-09-15 ENCOUNTER — ANTI-COAG VISIT (OUTPATIENT)
Dept: FAMILY MEDICINE CLINIC | Facility: CLINIC | Age: 68
End: 2018-09-15

## 2018-09-15 DIAGNOSIS — Z86.73 HX OF STROKE ASSOCIATED WITH BLOOD CLOTTING TENDENCY: ICD-10-CM

## 2018-09-15 DIAGNOSIS — Z79.01 LONG TERM CURRENT USE OF ANTICOAGULANT THERAPY: ICD-10-CM

## 2018-09-15 LAB — INR: 1.8 (ref 0.8–1.2)

## 2018-09-15 NOTE — PROGRESS NOTES
Resume alternating 6 mg and 5 mg every other day.   Patient may take 6 mg today and then 5 mg tomorrow, etc.  Patient to recheck INR in 1 week

## 2018-09-15 NOTE — PROGRESS NOTES
Patient INR today was 1.8  Patient states INR was high so Fouzia Stone changed her dose  Patient currently taking 6 mg on Tu and Th and 5 mg rest of the week    Signed and Held Admit Orders (720h ago, onward)    None

## 2018-09-15 NOTE — PROGRESS NOTES
Patient given coumadin recommendations and expressed understanding    Signed and Held Admit Orders (720h ago, onward)    None

## 2018-09-24 ENCOUNTER — ANTI-COAG VISIT (OUTPATIENT)
Dept: FAMILY MEDICINE CLINIC | Facility: CLINIC | Age: 68
End: 2018-09-24

## 2018-09-24 DIAGNOSIS — Z79.01 LONG TERM CURRENT USE OF ANTICOAGULANT THERAPY: ICD-10-CM

## 2018-09-24 DIAGNOSIS — Z86.73 HX OF STROKE ASSOCIATED WITH BLOOD CLOTTING TENDENCY: ICD-10-CM

## 2018-09-24 LAB — INR: 2 (ref 0.8–1.2)

## 2018-09-24 NOTE — PROGRESS NOTES
INR checked by patient on home meter.     CURRENT COUMADIN DOSE:  5mg M,W,F and 6mg daily the rest of the week     CHANGES OR COMPLAINTS:  Currently traveling    INR RESULTS TODAY:  2.0 ( in goal)    PLAN:  CPM coumadin dose  Next INR in 2 weeks  Patient no

## 2018-10-09 ENCOUNTER — ANTI-COAG VISIT (OUTPATIENT)
Dept: FAMILY MEDICINE CLINIC | Facility: CLINIC | Age: 68
End: 2018-10-09

## 2018-10-09 DIAGNOSIS — Z79.01 LONG TERM CURRENT USE OF ANTICOAGULANT THERAPY: ICD-10-CM

## 2018-10-09 DIAGNOSIS — Z86.73 HX OF STROKE ASSOCIATED WITH BLOOD CLOTTING TENDENCY: ICD-10-CM

## 2018-10-09 RX ORDER — ESOMEPRAZOLE MAGNESIUM 40 MG/1
CAPSULE, DELAYED RELEASE ORAL
Qty: 90 CAPSULE | Refills: 3 | Status: SHIPPED | OUTPATIENT
Start: 2018-10-09 | End: 2019-03-22

## 2018-10-09 NOTE — TELEPHONE ENCOUNTER
RF request from [employer] [facility] for Esomeprazole 40 MG #90. Please advise.      Future appt:    Last Appointment:  9/11/2018; Recheck for annual check next year    Cholesterol, Total (mg/dL)   Date Value   06/25/2018 205 (H)     HDL Cholesterol (mg/dL)   D

## 2018-10-09 NOTE — PROGRESS NOTES
INR checked by patient on home meter.     CURRENT COUMADIN DOSE:  5mg M,W,F and 6mg daily the rest of the week     CHANGES OR COMPLAINTS:  Will travel this next month    INR RESULTS TODAY:  2.7 ( in goal)    PLAN:  CPM coumadin   INR in one month sooner at

## 2018-11-02 ENCOUNTER — ANTI-COAG VISIT (OUTPATIENT)
Dept: FAMILY MEDICINE CLINIC | Facility: CLINIC | Age: 68
End: 2018-11-02

## 2018-11-02 DIAGNOSIS — Z79.01 LONG TERM CURRENT USE OF ANTICOAGULANT THERAPY: ICD-10-CM

## 2018-11-02 DIAGNOSIS — Z86.73 HX OF STROKE ASSOCIATED WITH BLOOD CLOTTING TENDENCY: ICD-10-CM

## 2018-11-05 ENCOUNTER — TELEPHONE (OUTPATIENT)
Dept: FAMILY MEDICINE CLINIC | Facility: CLINIC | Age: 68
End: 2018-11-05

## 2018-11-05 NOTE — TELEPHONE ENCOUNTER
Patient notified of [employer] coagucheck xs test strip recall. Patient checked her bottle of strips that she used to check her last INR on 11/2/18. The lot number was NOT was of the affected lot numbers that have been recalled. Her INR was in range at 2.

## 2018-11-06 NOTE — TELEPHONE ENCOUNTER
Patient informed of the below recommendations. Phone note routed to Bring[employer]. in the coumadin clinic to make sure nothing further is needed to be documented. Ayse Marcelo, please advise.

## 2018-12-05 ENCOUNTER — ANTI-COAG VISIT (OUTPATIENT)
Dept: FAMILY MEDICINE CLINIC | Facility: CLINIC | Age: 68
End: 2018-12-05
Payer: MEDICARE

## 2018-12-05 DIAGNOSIS — Z86.73 HX OF STROKE ASSOCIATED WITH BLOOD CLOTTING TENDENCY: ICD-10-CM

## 2018-12-05 DIAGNOSIS — Z79.01 LONG TERM CURRENT USE OF ANTICOAGULANT THERAPY: ICD-10-CM

## 2018-12-05 PROCEDURE — 85610 PROTHROMBIN TIME: CPT | Performed by: FAMILY MEDICINE

## 2018-12-05 PROCEDURE — 93793 ANTICOAG MGMT PT WARFARIN: CPT | Performed by: FAMILY MEDICINE

## 2018-12-05 NOTE — PROGRESS NOTES
Here for INR check in coumadin clinic in the office.     CURRENT COUMADIN DOSE:  5mg M,W,F and 6mg daily the rest of the week     CHANGES OR COMPLAINTS:  No changes    INR RESULTS TODAY:  2.3 ( in goal)    PLAN:  CPM coumadin   Next INR in one month on home

## 2018-12-09 DIAGNOSIS — Z00.00 ENCOUNTER FOR ANNUAL HEALTH EXAMINATION: ICD-10-CM

## 2018-12-10 RX ORDER — CELECOXIB 200 MG/1
CAPSULE ORAL
Qty: 90 CAPSULE | Refills: 2 | Status: SHIPPED | OUTPATIENT
Start: 2018-12-10 | End: 2019-03-22

## 2018-12-10 NOTE — TELEPHONE ENCOUNTER
Future appt:    Last Appointment:  9/11/2018-  Pt was advised to recheck for annual next year    Celecoxib refilled on 9/10/18 for #90, 0 refills  Cholesterol, Total (mg/dL)   Date Value   06/25/2018 205 (H)     HDL Cholesterol (mg/dL)   Date Value   06/25/2018 57     LDL Cholesterol (mg/dL)   Date Value   06/25/2018 134 (H)     Triglycerides (mg/dL)   Date Value   06/25/2018 70     No results found for: EAG, A1C  Lab Results   Component Value Date    TSH 2.920 03/12/2018       No Follow-up on file.

## 2019-01-01 LAB — INR: 2.5 (ref 0.8–1.2)

## 2019-01-02 ENCOUNTER — ANTI-COAG VISIT (OUTPATIENT)
Dept: FAMILY MEDICINE CLINIC | Facility: CLINIC | Age: 69
End: 2019-01-02

## 2019-01-02 DIAGNOSIS — Z79.01 LONG TERM CURRENT USE OF ANTICOAGULANT THERAPY: ICD-10-CM

## 2019-01-02 DIAGNOSIS — Z86.73 HX OF STROKE ASSOCIATED WITH BLOOD CLOTTING TENDENCY: ICD-10-CM

## 2019-01-02 NOTE — PROGRESS NOTES
INR checked by patient on home meter.     CURRENT COUMADIN DOSE:  5mg M,W,F and 6mg daily the rest of the week     CHANGES OR COMPLAINTS:  No changes    INR RESULTS TODAY:  2.5 ( in goal)     PLAN:  CPM coumadin   Next INR due in one month  Patient notified

## 2019-01-07 ENCOUNTER — TELEPHONE (OUTPATIENT)
Dept: FAMILY MEDICINE CLINIC | Facility: CLINIC | Age: 69
End: 2019-01-07

## 2019-01-07 ENCOUNTER — ANTI-COAG VISIT (OUTPATIENT)
Dept: FAMILY MEDICINE CLINIC | Facility: CLINIC | Age: 69
End: 2019-01-07

## 2019-01-07 DIAGNOSIS — Z86.73 HX OF STROKE ASSOCIATED WITH BLOOD CLOTTING TENDENCY: ICD-10-CM

## 2019-01-07 DIAGNOSIS — Z79.01 LONG TERM CURRENT USE OF ANTICOAGULANT THERAPY: ICD-10-CM

## 2019-01-07 LAB — INR: 1.7 (ref 0.8–1.2)

## 2019-01-07 NOTE — PROGRESS NOTES
INR checked by patient on home meter. Patient in [place] for winter.      CURRENT COUMADIN DOSE:  5mg M,W,F and 6mg daily the rest of the week     CHANGES OR COMPLAINTS:  Benton Cardoza started 1/6/18 will continue thru 1/16/18  Antibiotic injection on 1/5/18

## 2019-01-08 ENCOUNTER — TELEPHONE (OUTPATIENT)
Dept: FAMILY MEDICINE CLINIC | Facility: CLINIC | Age: 69
End: 2019-01-08

## 2019-01-08 DIAGNOSIS — E78.49 FAMILIAL MULTIPLE LIPOPROTEIN-TYPE HYPERLIPIDEMIA: Primary | ICD-10-CM

## 2019-01-08 DIAGNOSIS — I10 BENIGN ESSENTIAL HYPERTENSION: ICD-10-CM

## 2019-01-08 DIAGNOSIS — M81.0 AGE-RELATED OSTEOPOROSIS WITHOUT CURRENT PATHOLOGICAL FRACTURE: ICD-10-CM

## 2019-01-08 NOTE — TELEPHONE ENCOUNTER
Future appt:     Your appointments     Date & Time Appointment Department [facility])    Apr 12, 2019  9:15 AM CDT Laboratory Visit with REF [facility] ([facility] [place])    Apr 17, 2019  1:30 PM CDT Medicare Annual Well Visit with Brendan Tapia

## 2019-01-08 NOTE — TELEPHONE ENCOUNTER
----- Message from Arlette Smith sent at 1/8/2019  3:47 PM CST -----  Regarding: Lab order  Scheduled annual px for April. Fasting labs prior. Will need lab order for chart please.

## 2019-01-10 ENCOUNTER — ANTI-COAG VISIT (OUTPATIENT)
Dept: FAMILY MEDICINE CLINIC | Facility: CLINIC | Age: 69
End: 2019-01-10

## 2019-01-10 DIAGNOSIS — Z86.73 HX OF STROKE ASSOCIATED WITH BLOOD CLOTTING TENDENCY: ICD-10-CM

## 2019-01-10 DIAGNOSIS — Z79.01 LONG TERM CURRENT USE OF ANTICOAGULANT THERAPY: ICD-10-CM

## 2019-01-10 LAB — INR: 2.1 (ref 0.8–1.2)

## 2019-01-10 NOTE — PROGRESS NOTES
INR checked by patient on home meter.     CURRENT COUMADIN DOSE:  6mg daily     CHANGES OR COMPLAINTS:  Cefdinir until 1/16/19    INR RESULTS TODAY:  2.1 ( back up into goal range)    PLAN:  Resume regular coumadin dose of  5mg M,W,F and 6mg daily the rest

## 2019-01-16 ENCOUNTER — ANTI-COAG VISIT (OUTPATIENT)
Dept: FAMILY MEDICINE CLINIC | Facility: CLINIC | Age: 69
End: 2019-01-16

## 2019-01-16 DIAGNOSIS — Z79.01 LONG TERM CURRENT USE OF ANTICOAGULANT THERAPY: ICD-10-CM

## 2019-01-16 DIAGNOSIS — Z86.73 HX OF STROKE ASSOCIATED WITH BLOOD CLOTTING TENDENCY: ICD-10-CM

## 2019-01-16 LAB — INR: 2.3 (ref 0.8–1.2)

## 2019-01-16 NOTE — PROGRESS NOTES
INR checked by patient on home meter.     CURRENT COUMADIN DOSE:  5mg M,W,F and 6mg daily the rest of the week     CHANGES OR COMPLAINTS:  Finished antibiotic    INR RESULTS TODAY:  2.3 ( in goal)    PLAN:  CPM coumadin   Next INR due in one month on home m

## 2019-02-14 ENCOUNTER — ANTI-COAG VISIT (OUTPATIENT)
Dept: FAMILY MEDICINE CLINIC | Facility: CLINIC | Age: 69
End: 2019-02-14

## 2019-02-14 DIAGNOSIS — Z79.01 LONG TERM CURRENT USE OF ANTICOAGULANT THERAPY: ICD-10-CM

## 2019-02-14 DIAGNOSIS — Z86.73 HX OF STROKE ASSOCIATED WITH BLOOD CLOTTING TENDENCY: ICD-10-CM

## 2019-02-14 LAB — INR: 2.8 (ref 0.8–1.2)

## 2019-02-14 NOTE — PROGRESS NOTES
INR checked on Home meter    CURRENT COUMADIN DOSE:  5mg M,W,F and 6mg daily the rest of the week     CHANGES OR COMPLAINTS:  No changes    INR RESULTS TODAY:  2.8 ( in goal)     PLAN:  CPM coumadin   Next INR due in one month  Written coumadin dosing inst

## 2019-03-08 DIAGNOSIS — Z00.00 ENCOUNTER FOR ANNUAL HEALTH EXAMINATION: ICD-10-CM

## 2019-03-11 RX ORDER — WARFARIN SODIUM 1 MG
TABLET ORAL
Qty: 100 TABLET | Refills: 3 | Status: SHIPPED | OUTPATIENT
Start: 2019-03-11 | End: 2019-03-22

## 2019-03-11 RX ORDER — WARFARIN SODIUM 5 MG/1
TABLET ORAL
Qty: 100 TABLET | Refills: 3 | Status: SHIPPED | OUTPATIENT
Start: 2019-03-11 | End: 2019-03-22

## 2019-03-11 NOTE — TELEPHONE ENCOUNTER
Future appt:     Your appointments     Date & Time Appointment Department [facility])    Mar 20, 2019  8:15 AM CDT Laboratory Visit with REF Mikala [facility] ([facility] [place])    Mar 22, 2019  2:00 PM CDT Medicare Annual Well Visit with Jamey Osler

## 2019-03-15 LAB — INR: 2.4 (ref 0.8–1.2)

## 2019-03-16 ENCOUNTER — ANTI-COAG VISIT (OUTPATIENT)
Dept: FAMILY MEDICINE CLINIC | Facility: CLINIC | Age: 69
End: 2019-03-16

## 2019-03-16 DIAGNOSIS — Z86.73 HX OF STROKE ASSOCIATED WITH BLOOD CLOTTING TENDENCY: ICD-10-CM

## 2019-03-16 DIAGNOSIS — Z79.01 LONG TERM CURRENT USE OF ANTICOAGULANT THERAPY: ICD-10-CM

## 2019-03-16 NOTE — PROGRESS NOTES
Margarita advised that patient continue same dose and recheck in 1 month. Patient confirmed and had no other questions at this time.

## 2019-03-17 DIAGNOSIS — Z00.00 ENCOUNTER FOR ANNUAL HEALTH EXAMINATION: ICD-10-CM

## 2019-03-18 RX ORDER — FLUOXETINE HYDROCHLORIDE 40 MG/1
CAPSULE ORAL
Qty: 90 CAPSULE | Refills: 0 | Status: SHIPPED | OUTPATIENT
Start: 2019-03-18 | End: 2019-03-22

## 2019-03-18 NOTE — TELEPHONE ENCOUNTER
Future appt:     Your appointments     Date & Time Appointment Department [facility])    Mar 20, 2019  8:15 AM CDT Laboratory Visit with REF Ofe [facility] ([facility] [place])    Mar 22, 2019  2:00 PM CDT Medicare Annual Well Visit with Kwesi Hernández

## 2019-03-20 ENCOUNTER — LABORATORY ENCOUNTER (OUTPATIENT)
Dept: LAB | Age: 69
End: 2019-03-20
Attending: FAMILY MEDICINE
Payer: MEDICARE

## 2019-03-20 DIAGNOSIS — E78.49 FAMILIAL MULTIPLE LIPOPROTEIN-TYPE HYPERLIPIDEMIA: ICD-10-CM

## 2019-03-20 DIAGNOSIS — M81.0 AGE-RELATED OSTEOPOROSIS WITHOUT CURRENT PATHOLOGICAL FRACTURE: ICD-10-CM

## 2019-03-20 DIAGNOSIS — I10 BENIGN ESSENTIAL HYPERTENSION: ICD-10-CM

## 2019-03-20 LAB
ALBUMIN SERPL-MCNC: 3.4 G/DL (ref 3.4–5)
ALBUMIN/GLOB SERPL: 0.8 {RATIO} (ref 1–2)
ALP LIVER SERPL-CCNC: 64 U/L (ref 55–142)
ALT SERPL-CCNC: 25 U/L (ref 13–56)
ANION GAP SERPL CALC-SCNC: 8 MMOL/L (ref 0–18)
AST SERPL-CCNC: 28 U/L (ref 15–37)
BASOPHILS # BLD AUTO: 0.05 X10(3) UL (ref 0–0.2)
BASOPHILS NFR BLD AUTO: 1 %
BILIRUB SERPL-MCNC: 0.6 MG/DL (ref 0.1–2)
BILIRUB UR QL STRIP.AUTO: NEGATIVE
BUN BLD-MCNC: 10 MG/DL (ref 7–18)
BUN/CREAT SERPL: 11.2 (ref 10–20)
CALCIUM BLD-MCNC: 9.4 MG/DL (ref 8.5–10.1)
CHLORIDE SERPL-SCNC: 106 MMOL/L (ref 98–107)
CHOLEST SMN-MCNC: 186 MG/DL (ref ?–200)
CLARITY UR REFRACT.AUTO: CLEAR
CO2 SERPL-SCNC: 27 MMOL/L (ref 21–32)
COLOR UR AUTO: YELLOW
CREAT BLD-MCNC: 0.89 MG/DL (ref 0.55–1.02)
DEPRECATED RDW RBC AUTO: 49.9 FL (ref 35.1–46.3)
EOSINOPHIL # BLD AUTO: 0.06 X10(3) UL (ref 0–0.7)
EOSINOPHIL NFR BLD AUTO: 1.2 %
ERYTHROCYTE [DISTWIDTH] IN BLOOD BY AUTOMATED COUNT: 14.9 % (ref 11–15)
GLOBULIN PLAS-MCNC: 4.1 G/DL (ref 2.8–4.4)
GLUCOSE BLD-MCNC: 86 MG/DL (ref 70–99)
GLUCOSE UR STRIP.AUTO-MCNC: NEGATIVE MG/DL
HCT VFR BLD AUTO: 42 % (ref 35–48)
HDLC SERPL-MCNC: 57 MG/DL (ref 40–59)
HGB BLD-MCNC: 13.8 G/DL (ref 12–16)
IMM GRANULOCYTES # BLD AUTO: 0.01 X10(3) UL (ref 0–1)
IMM GRANULOCYTES NFR BLD: 0.2 %
KETONES UR STRIP.AUTO-MCNC: 20 MG/DL
LDLC SERPL CALC-MCNC: 118 MG/DL (ref ?–100)
LEUKOCYTE ESTERASE UR QL STRIP.AUTO: NEGATIVE
LYMPHOCYTES # BLD AUTO: 1.36 X10(3) UL (ref 1–4)
LYMPHOCYTES NFR BLD AUTO: 26.7 %
M PROTEIN MFR SERPL ELPH: 7.5 G/DL (ref 6.4–8.2)
MCH RBC QN AUTO: 30.3 PG (ref 26–34)
MCHC RBC AUTO-ENTMCNC: 32.9 G/DL (ref 31–37)
MCV RBC AUTO: 92.1 FL (ref 80–100)
MONOCYTES # BLD AUTO: 0.43 X10(3) UL (ref 0.1–1)
MONOCYTES NFR BLD AUTO: 8.4 %
NEUTROPHILS # BLD AUTO: 3.18 X10 (3) UL (ref 1.5–7.7)
NEUTROPHILS # BLD AUTO: 3.18 X10(3) UL (ref 1.5–7.7)
NEUTROPHILS NFR BLD AUTO: 62.5 %
NITRITE UR QL STRIP.AUTO: NEGATIVE
NONHDLC SERPL-MCNC: 129 MG/DL (ref ?–130)
OSMOLALITY SERPL CALC.SUM OF ELEC: 290 MOSM/KG (ref 275–295)
PH UR STRIP.AUTO: 5 [PH] (ref 4.5–8)
PLATELET # BLD AUTO: 254 10(3)UL (ref 150–450)
POTASSIUM SERPL-SCNC: 4.9 MMOL/L (ref 3.5–5.1)
PROT UR STRIP.AUTO-MCNC: NEGATIVE MG/DL
RBC # BLD AUTO: 4.56 X10(6)UL (ref 3.8–5.3)
RBC UR QL AUTO: NEGATIVE
SODIUM SERPL-SCNC: 141 MMOL/L (ref 136–145)
SP GR UR STRIP.AUTO: 1.01 (ref 1–1.03)
T4 FREE SERPL-MCNC: 1.3 NG/DL (ref 0.8–1.7)
TRIGL SERPL-MCNC: 55 MG/DL (ref 30–149)
TSI SER-ACNC: 5.17 MIU/ML (ref 0.36–3.74)
UROBILINOGEN UR STRIP.AUTO-MCNC: <2 MG/DL
VIT D+METAB SERPL-MCNC: 60.1 NG/ML (ref 30–100)
VLDLC SERPL CALC-MCNC: 11 MG/DL (ref 0–30)
WBC # BLD AUTO: 5.1 X10(3) UL (ref 4–11)

## 2019-03-20 PROCEDURE — 84443 ASSAY THYROID STIM HORMONE: CPT

## 2019-03-20 PROCEDURE — 82306 VITAMIN D 25 HYDROXY: CPT

## 2019-03-20 PROCEDURE — 36415 COLL VENOUS BLD VENIPUNCTURE: CPT

## 2019-03-20 PROCEDURE — 80061 LIPID PANEL: CPT

## 2019-03-20 PROCEDURE — 84439 ASSAY OF FREE THYROXINE: CPT

## 2019-03-20 PROCEDURE — 81003 URINALYSIS AUTO W/O SCOPE: CPT

## 2019-03-20 PROCEDURE — 80053 COMPREHEN METABOLIC PANEL: CPT

## 2019-03-20 PROCEDURE — 85025 COMPLETE CBC W/AUTO DIFF WBC: CPT

## 2019-03-22 ENCOUNTER — OFFICE VISIT (OUTPATIENT)
Dept: FAMILY MEDICINE CLINIC | Facility: CLINIC | Age: 69
End: 2019-03-22
Payer: MEDICARE

## 2019-03-22 VITALS
WEIGHT: 254 LBS | DIASTOLIC BLOOD PRESSURE: 70 MMHG | SYSTOLIC BLOOD PRESSURE: 118 MMHG | OXYGEN SATURATION: 97 % | HEIGHT: 62.75 IN | BODY MASS INDEX: 45.57 KG/M2 | RESPIRATION RATE: 16 BRPM | HEART RATE: 72 BPM | TEMPERATURE: 99 F

## 2019-03-22 DIAGNOSIS — E78.49 FAMILIAL MULTIPLE LIPOPROTEIN-TYPE HYPERLIPIDEMIA: Primary | ICD-10-CM

## 2019-03-22 DIAGNOSIS — Z86.73 HX OF STROKE ASSOCIATED WITH BLOOD CLOTTING TENDENCY: ICD-10-CM

## 2019-03-22 DIAGNOSIS — G43.009 MIGRAINE WITHOUT AURA AND WITHOUT STATUS MIGRAINOSUS, NOT INTRACTABLE: ICD-10-CM

## 2019-03-22 DIAGNOSIS — Z79.01 LONG TERM CURRENT USE OF ANTICOAGULANT THERAPY: ICD-10-CM

## 2019-03-22 DIAGNOSIS — M81.0 AGE-RELATED OSTEOPOROSIS WITHOUT CURRENT PATHOLOGICAL FRACTURE: ICD-10-CM

## 2019-03-22 DIAGNOSIS — I10 BENIGN ESSENTIAL HYPERTENSION: ICD-10-CM

## 2019-03-22 DIAGNOSIS — Z00.00 ENCOUNTER FOR ANNUAL HEALTH EXAMINATION: ICD-10-CM

## 2019-03-22 DIAGNOSIS — I67.1 CEREBRAL ANEURYSM, NONRUPTURED: ICD-10-CM

## 2019-03-22 DIAGNOSIS — Z13.31 DEPRESSION SCREENING: ICD-10-CM

## 2019-03-22 DIAGNOSIS — K21.9 GASTROESOPHAGEAL REFLUX DISEASE WITHOUT ESOPHAGITIS: ICD-10-CM

## 2019-03-22 DIAGNOSIS — M48.00 SPINAL STENOSIS, UNSPECIFIED SPINAL REGION: ICD-10-CM

## 2019-03-22 DIAGNOSIS — D68.59 PRIMARY HYPERCOAGULABLE STATE (HCC): ICD-10-CM

## 2019-03-22 DIAGNOSIS — F41.1 ANXIETY STATE: ICD-10-CM

## 2019-03-22 DIAGNOSIS — I67.89 ACUTE ILL-DEFINED CEREBROVASCULAR DISEASE: ICD-10-CM

## 2019-03-22 DIAGNOSIS — M15.9 PRIMARY OSTEOARTHRITIS INVOLVING MULTIPLE JOINTS: ICD-10-CM

## 2019-03-22 PROCEDURE — G0439 PPPS, SUBSEQ VISIT: HCPCS | Performed by: FAMILY MEDICINE

## 2019-03-22 PROCEDURE — G0444 DEPRESSION SCREEN ANNUAL: HCPCS | Performed by: FAMILY MEDICINE

## 2019-03-22 RX ORDER — WARFARIN SODIUM 5 MG/1
TABLET ORAL
COMMUNITY
End: 2019-03-22

## 2019-03-22 RX ORDER — ESOMEPRAZOLE MAGNESIUM 40 MG/1
CAPSULE, DELAYED RELEASE ORAL
Qty: 90 CAPSULE | Refills: 3 | Status: SHIPPED | OUTPATIENT
Start: 2019-03-22 | End: 2020-03-16

## 2019-03-22 RX ORDER — FLUOXETINE HYDROCHLORIDE 40 MG/1
40 CAPSULE ORAL
Qty: 90 CAPSULE | Refills: 2 | Status: SHIPPED | OUTPATIENT
Start: 2019-03-22 | End: 2020-02-05

## 2019-03-22 RX ORDER — WARFARIN SODIUM 5 MG/1
TABLET ORAL
Qty: 100 TABLET | Refills: 2 | Status: SHIPPED | OUTPATIENT
Start: 2019-03-22 | End: 2019-03-27

## 2019-03-22 RX ORDER — WARFARIN SODIUM 1 MG/1
TABLET ORAL
COMMUNITY
End: 2019-03-27

## 2019-03-22 RX ORDER — TRIAMCINOLONE ACETONIDE 5 MG/G
CREAM TOPICAL
Qty: 15 G | Refills: 2 | Status: SHIPPED | OUTPATIENT
Start: 2019-03-22 | End: 2020-04-08

## 2019-03-22 RX ORDER — CELECOXIB 200 MG/1
200 CAPSULE ORAL
Qty: 90 CAPSULE | Refills: 2 | Status: SHIPPED | OUTPATIENT
Start: 2019-03-22 | End: 2020-01-29

## 2019-03-22 RX ORDER — METOPROLOL SUCCINATE 100 MG/1
100 TABLET, EXTENDED RELEASE ORAL DAILY
Qty: 90 TABLET | Refills: 3 | Status: SHIPPED | OUTPATIENT
Start: 2019-03-22 | End: 2020-04-01

## 2019-03-22 RX ORDER — DESONIDE 0.5 MG/G
CREAM TOPICAL
Qty: 15 G | Refills: 1 | Status: SHIPPED | OUTPATIENT
Start: 2019-03-22 | End: 2020-04-08

## 2019-03-22 RX ORDER — WARFARIN SODIUM 1 MG/1
TABLET ORAL
Qty: 100 TABLET | Refills: 2 | Status: SHIPPED | OUTPATIENT
Start: 2019-03-22 | End: 2019-09-24 | Stop reason: DRUGHIGH

## 2019-03-22 NOTE — PROGRESS NOTES
CC: Annual Physical Exam    HPI:   Delmer London is a 70year old female who presents for a complete physical exam.  Patient complains of chronic back pain. Seeing chiropractor     Pt travel- [place] 16 day cruise.  Did well with recent trip  Pt able to wak difficulty seeing?: 0-No    Do you have any difficulty walking or getting up?: 0-No    Do you have any tripping hazards?: 0-No    Are you on multiple medications?: 1-Yes    Does pain affect your day to day activities?: 1-Yes     Have you had any memory iss Microscopic Microscopic not indicated    TSH W REFLEX TO FREE T4   Result Value Ref Range    TSH 5.170 (H) 0.358 - 3.740 mIU/mL   LIPID PANEL   Result Value Ref Range    Cholesterol, Total 186 <200 mg/dL    HDL Cholesterol 57 40 - 59 mg/dL    Triglycerides % 8.4 %    Eosinophil % 1.2 %    Basophil % 1.0 %    Immature Granulocyte % 0.2 %         Current Outpatient Medications:  Warfarin Sodium (COUMADIN) 1 MG Oral Tab Take with 5 mg tablet (6 mg total) on Tue/Thur/Sat/Sun Disp:  Rfl:    Metoprolol Succinate E • Chronic bilateral low back pain without sciatica 9/11/2018   • Depression    • GERD (gastroesophageal reflux disease)    • Hx of stroke associated with blood clotting tendency 5/23/2018 1997   • Hyperlipidemia    • Hypertension    • Migraine    • Sp Denies anemia, bleeding or bruising. LYMPHATICS:  Denies enlarged nodes  PSYCHIATRIC:  Denies depression or anxiety. ENDOCRINOLOGIC:  Denies excessive sweating, cold or heat intolerance, polyuria or polydipsia.   ALLERGIES:  Denies allergic response, hist content are normal.    ASSESSMENT AND PLAN:   Joseluis Merida is a 70year old female who presents for a complete physical exam.     1. Encounter for annual health examination  Patient generally doing well at her baseline of health care she has lost 11 pound disease without esophagitis  Patient stable on current protein pump inhibitor would like to continue. Order for mammogram and dexascan  Self Breast exam reviewed.   Health maintenance, will check: Orders Placed This Encounter      Depression Screening years or older   • History of gestational diabetes or birth of baby weighing more than 9 pounds     Covered at least every 3 years,         Glucose (mg/dL)   Date Value   03/20/2019 80    Medicare covers annually or at 6-month intervals for prediabetic pat 72    Covered yearly for Long term Glucocorticoid medication (Steroids) Requires diagnosis related to osteoporosis or estrogen deficiency.    Biennial benefit unless patient has history of long-term glucocorticoid use for medical condition    Last Dexa Scan Zoster (Not covered by Medicare Part B) No orders found for this or any previous visit. This may be covered with your pharmacy  prescription benefits     Recommended Websites for Advanced Directives    [email or web address]. org/publications/Documents/personal_de HCl 40 MG Oral Cap 90 capsule 2     Sig: Take 1 capsule (40 mg total) by mouth once daily.    • triamcinolone acetonide 0.5 % External Cream 15 g 2     Sig: Apply tid prn   • Desonide (DESOWEN) 0.05 % External Cream 15 g 1     Sig: APPLY SMALL AMOUNT TO AFF

## 2019-03-22 NOTE — PATIENT INSTRUCTIONS
continue walking and diet manangment for weight loss    Recheck thyroid function in 6 months    Continue present meds    Consider -- SHINGRIX    rec 6 months sooner if concerns        Vanesa Damico's SCREENING SCHEDULE   Tests on this list are recomme more than 100 cigarettes in their lifetime   • Anyone with a family history    Colorectal Cancer Screening  Covered up to Age 76     Colonoscopy Screen   Covered every 10 years- more often if abnormal Colonoscopy due on 06/06/2021 Update [facility] Pneumococcal 13 (Prevnar)  Covered Once after 65 No orders found for this or any previous visit.  Please get once after your 65th birthday    Pneumococcal 23 (Pneumovax)  Covered Once after 65 Orders placed or performed in visit on 10/11/17   • PNEUMOCOCCAL

## 2019-03-27 ENCOUNTER — TELEPHONE (OUTPATIENT)
Dept: FAMILY MEDICINE CLINIC | Facility: CLINIC | Age: 69
End: 2019-03-27

## 2019-03-27 DIAGNOSIS — Z79.01 LONG TERM CURRENT USE OF ANTICOAGULANT THERAPY: Primary | ICD-10-CM

## 2019-03-27 RX ORDER — WARFARIN SODIUM 5 MG/1
TABLET ORAL
Qty: 100 TABLET | Refills: 2 | Status: SHIPPED | OUTPATIENT
Start: 2019-03-27 | End: 2019-09-24 | Stop reason: DRUGHIGH

## 2019-03-27 NOTE — TELEPHONE ENCOUNTER
Sp[facility]. She is calling to clarify warfarin dosing on recent refills sent to them. Script sent in for two strengths of warfarin:   1mg tablets and 5mg tablets.      Currently patient is taking 5mg M,W,F and 6mg daily the rest o

## 2019-04-12 ENCOUNTER — ANTI-COAG VISIT (OUTPATIENT)
Dept: FAMILY MEDICINE CLINIC | Facility: CLINIC | Age: 69
End: 2019-04-12

## 2019-04-12 ENCOUNTER — TELEPHONE (OUTPATIENT)
Dept: FAMILY MEDICINE CLINIC | Facility: CLINIC | Age: 69
End: 2019-04-12

## 2019-04-12 DIAGNOSIS — Z79.01 LONG TERM CURRENT USE OF ANTICOAGULANT THERAPY: ICD-10-CM

## 2019-04-12 DIAGNOSIS — Z86.73 HX OF STROKE ASSOCIATED WITH BLOOD CLOTTING TENDENCY: ICD-10-CM

## 2019-04-12 NOTE — TELEPHONE ENCOUNTER
Call from [address]-    INR taken and reported today is 1.8. Please see anti-coag note- routed to MD.  Pt was contacted.

## 2019-04-12 NOTE — PROGRESS NOTES
[facility]  Pt INR today 1.8  Pt is taking Coumad 5 mg M/W/F, 6 mg the rest of the days. Pt states she is eating more salads lately, trying to diet.

## 2019-04-12 NOTE — PROGRESS NOTES
Reviewed INR-- INcrease coumadin to 5 mg  On T/TH and 6 mg other days of the week.  Recheck inr 1 Week

## 2019-04-26 ENCOUNTER — TELEPHONE (OUTPATIENT)
Dept: FAMILY MEDICINE CLINIC | Facility: CLINIC | Age: 69
End: 2019-04-26

## 2019-04-26 ENCOUNTER — ANTI-COAG VISIT (OUTPATIENT)
Dept: FAMILY MEDICINE CLINIC | Facility: CLINIC | Age: 69
End: 2019-04-26

## 2019-04-26 DIAGNOSIS — Z79.01 LONG TERM CURRENT USE OF ANTICOAGULANT THERAPY: ICD-10-CM

## 2019-04-26 DIAGNOSIS — Z86.73 HX OF STROKE ASSOCIATED WITH BLOOD CLOTTING TENDENCY: ICD-10-CM

## 2019-04-26 NOTE — PROGRESS NOTES
I would recommend patient increase her Coumadin to 6 mg a day. I am assuming that she can continue to eat 3 salads a week.   Recheck INR in 1 week

## 2019-04-26 NOTE — PROGRESS NOTES
INR (home monitor) 1.4. Pt states she flew home early Thursday morning (returned from [place]). Pt states she tried to watch diet more carefully, states she ate 3 salads, states it was less than week previous.   INR on 4/12: 1.8  Pt denies any other changes  P

## 2019-05-08 ENCOUNTER — ANTI-COAG VISIT (OUTPATIENT)
Dept: FAMILY MEDICINE CLINIC | Facility: CLINIC | Age: 69
End: 2019-05-08

## 2019-05-08 DIAGNOSIS — Z86.73 HX OF STROKE ASSOCIATED WITH BLOOD CLOTTING TENDENCY: ICD-10-CM

## 2019-05-08 DIAGNOSIS — Z79.01 LONG TERM CURRENT USE OF ANTICOAGULANT THERAPY: ICD-10-CM

## 2019-05-08 NOTE — PROGRESS NOTES
INR checked by patient on home meter. CURRENT COUMADIN DOSE:  6mg daily     CHANGES OR COMPLAINTS:  No changes    INR RESULTS TODAY:  2.2 ( in goal)    PLAN:  CPM coumadin   Continue same diet with 3 salads weekly  Next INR in 2 weeks on home meter.    P

## 2019-05-18 ENCOUNTER — TELEPHONE (OUTPATIENT)
Dept: FAMILY MEDICINE CLINIC | Facility: CLINIC | Age: 69
End: 2019-05-18

## 2019-05-18 NOTE — TELEPHONE ENCOUNTER
Received a call from answering service about INR. Results were 1.6. Patient thought when she called that she would not get a call back until Monday. Denies any changes in diet or medications. States that she is feeling fine.    Recommended to increase to

## 2019-05-20 ENCOUNTER — TELEPHONE (OUTPATIENT)
Dept: FAMILY MEDICINE CLINIC | Facility: CLINIC | Age: 69
End: 2019-05-20

## 2019-05-20 ENCOUNTER — ANTI-COAG VISIT (OUTPATIENT)
Dept: FAMILY MEDICINE CLINIC | Facility: CLINIC | Age: 69
End: 2019-05-20

## 2019-05-20 DIAGNOSIS — Z86.73 HX OF STROKE ASSOCIATED WITH BLOOD CLOTTING TENDENCY: ICD-10-CM

## 2019-05-20 DIAGNOSIS — Z79.01 LONG TERM CURRENT USE OF ANTICOAGULANT THERAPY: ICD-10-CM

## 2019-05-20 PROCEDURE — 85610 PROTHROMBIN TIME: CPT | Performed by: FAMILY MEDICINE

## 2019-05-20 NOTE — PROGRESS NOTES
Patient spoke with on call, Khari XAVIER on Saturday. See telephone encounter 5/18/19. INR checked on home meter.      CURRENT COUMADIN DOSE:  5mg M,W,F and 6mg daily the rest of the week     CHANGES OR COMPLAINTS:  No changes    INR RESULTS TODAY

## 2019-05-20 NOTE — PROGRESS NOTES
Here for INR check in coumadin clinic in the office.     CURRENT COUMADIN DOSE:  7mg daily for last 2 days ( regular dose is 6mg daily)    CHANGES OR COMPLAINTS:  Dieting, lost 20 pounds    INR RESULTS TODAY:  1.7 ( below goal)     PLAN:  7mg M,F and 6mg da

## 2019-05-29 ENCOUNTER — ANTI-COAG VISIT (OUTPATIENT)
Dept: FAMILY MEDICINE CLINIC | Facility: CLINIC | Age: 69
End: 2019-05-29

## 2019-05-29 DIAGNOSIS — Z79.01 LONG TERM CURRENT USE OF ANTICOAGULANT THERAPY: ICD-10-CM

## 2019-05-29 DIAGNOSIS — Z86.73 HX OF STROKE ASSOCIATED WITH BLOOD CLOTTING TENDENCY: ICD-10-CM

## 2019-05-29 NOTE — PROGRESS NOTES
INR checked by patient on home meter.     CURRENT COUMADIN DOSE:  7mg M,F and 6mg daily the rest of the week     CHANGES OR COMPLAINTS:  No changes    INR RESULTS TODAY:  1.7 ( INR still low)     PLAN:  Increase coumadin to 6mg Tu, Th, Sa and 7mg daily the

## 2019-06-04 ENCOUNTER — ANTI-COAG VISIT (OUTPATIENT)
Dept: FAMILY MEDICINE CLINIC | Facility: CLINIC | Age: 69
End: 2019-06-04

## 2019-06-04 ENCOUNTER — TELEPHONE (OUTPATIENT)
Dept: FAMILY MEDICINE CLINIC | Facility: CLINIC | Age: 69
End: 2019-06-04

## 2019-06-04 DIAGNOSIS — Z86.73 HX OF STROKE ASSOCIATED WITH BLOOD CLOTTING TENDENCY: ICD-10-CM

## 2019-06-04 DIAGNOSIS — Z79.01 LONG TERM CURRENT USE OF ANTICOAGULANT THERAPY: ICD-10-CM

## 2019-06-04 NOTE — PROGRESS NOTES
Pt INR today: 1.9. Pt denies any problems. Pt states she took Coumadin 7 mg last week Tues/Thurs/Sat/Sun and 6 mg rest of days. (dose pt reported taking differs what is noted on calender). Please advise.

## 2019-06-04 NOTE — PROGRESS NOTES
Reviewed. INR slightly low  rec increase coumadin to 6 mg M/ F and 7 mg rest of the days.  Recheck INR 1 week

## 2019-06-12 ENCOUNTER — ANTI-COAG VISIT (OUTPATIENT)
Dept: FAMILY MEDICINE CLINIC | Facility: CLINIC | Age: 69
End: 2019-06-12

## 2019-06-12 DIAGNOSIS — Z86.73 HX OF STROKE ASSOCIATED WITH BLOOD CLOTTING TENDENCY: ICD-10-CM

## 2019-06-12 DIAGNOSIS — Z79.01 LONG TERM CURRENT USE OF ANTICOAGULANT THERAPY: ICD-10-CM

## 2019-06-12 NOTE — PROGRESS NOTES
INR checked by patient on home meter.     CURRENT COUMADIN DOSE:  6mg M,F and 7mg daily the rest of the week    CHANGES OR COMPLAINTS:  No changes    INR RESULTS TODAY:  2.3 ( in goal)     PLAN:  CPM coumadin   INR in one week   Patient notified of coumadin

## 2019-06-26 ENCOUNTER — ANTI-COAG VISIT (OUTPATIENT)
Dept: FAMILY MEDICINE CLINIC | Facility: CLINIC | Age: 69
End: 2019-06-26

## 2019-06-26 DIAGNOSIS — Z86.73 HX OF STROKE ASSOCIATED WITH BLOOD CLOTTING TENDENCY: ICD-10-CM

## 2019-06-26 DIAGNOSIS — Z79.01 LONG TERM CURRENT USE OF ANTICOAGULANT THERAPY: ICD-10-CM

## 2019-06-26 NOTE — PROGRESS NOTES
INR checked by patient on home meter.     CURRENT COUMADIN DOSE:  6mg M,F and 7mg daily the rest of the week     CHANGES OR COMPLAINTS:  Grief- brother  of cardiac arrest this week  Has not eaten any salads    INR RESULTS TODAY:  3.4 ( above goal)     P

## 2019-07-08 ENCOUNTER — ANTI-COAG VISIT (OUTPATIENT)
Dept: FAMILY MEDICINE CLINIC | Facility: CLINIC | Age: 69
End: 2019-07-08

## 2019-07-08 DIAGNOSIS — Z79.01 LONG TERM CURRENT USE OF ANTICOAGULANT THERAPY: ICD-10-CM

## 2019-07-08 DIAGNOSIS — Z86.73 HX OF STROKE ASSOCIATED WITH BLOOD CLOTTING TENDENCY: ICD-10-CM

## 2019-07-08 LAB — INR: 1.4 (ref 0.8–1.2)

## 2019-07-09 NOTE — PROGRESS NOTES
INR checked by patient on home meter.     CURRENT COUMADIN DOSE:  6mg M,F and 7mg daily the rest of the week     CHANGES OR COMPLAINTS:  Home meter with frequent error messages  Finger with prolonged bleeding after using lancet to check INR    INR RESULTS T

## 2019-07-11 ENCOUNTER — ANTI-COAG VISIT (OUTPATIENT)
Dept: FAMILY MEDICINE CLINIC | Facility: CLINIC | Age: 69
End: 2019-07-11
Payer: MEDICARE

## 2019-07-11 DIAGNOSIS — Z86.73 HX OF STROKE ASSOCIATED WITH BLOOD CLOTTING TENDENCY: ICD-10-CM

## 2019-07-11 DIAGNOSIS — Z79.01 LONG TERM CURRENT USE OF ANTICOAGULANT THERAPY: ICD-10-CM

## 2019-07-11 LAB — INR: 1.4 (ref 0.8–1.2)

## 2019-07-11 PROCEDURE — 93793 ANTICOAG MGMT PT WARFARIN: CPT | Performed by: FAMILY MEDICINE

## 2019-07-11 PROCEDURE — 85610 PROTHROMBIN TIME: CPT | Performed by: FAMILY MEDICINE

## 2019-07-11 NOTE — PROGRESS NOTES
Here for INR check in coumadin clinic in the office. CURRENT COUMADIN DOSE:  7mg daily     CHANGES OR COMPLAINTS:  Did not eat any salads this week    INR RESULTS TODAY:  1.4 ( below goal)     PLAN:  Increase coumadin to 10mg daily for two days.    Then

## 2019-07-16 LAB — INR: 2.4 (ref 0.8–1.2)

## 2019-07-18 ENCOUNTER — ANTI-COAG VISIT (OUTPATIENT)
Dept: FAMILY MEDICINE CLINIC | Facility: CLINIC | Age: 69
End: 2019-07-18

## 2019-07-18 DIAGNOSIS — Z86.73 HX OF STROKE ASSOCIATED WITH BLOOD CLOTTING TENDENCY: ICD-10-CM

## 2019-07-18 DIAGNOSIS — Z79.01 LONG TERM CURRENT USE OF ANTICOAGULANT THERAPY: ICD-10-CM

## 2019-07-18 PROCEDURE — 85610 PROTHROMBIN TIME: CPT | Performed by: FAMILY MEDICINE

## 2019-07-18 NOTE — PROGRESS NOTES
INR checked by patient on home meter.     CURRENT COUMADIN DOSE:  10mg daily for two days, then 7mg daily     CHANGES OR COMPLAINTS:  No changes    INR RESULTS TODAY:  2.4 ( back up into goal range)     PLAN:  Coumadin 6mg M and 7mg daily the rest of the we

## 2019-07-24 LAB — INR: 1.9 (ref 0.8–1.2)

## 2019-07-25 ENCOUNTER — ANTI-COAG VISIT (OUTPATIENT)
Dept: FAMILY MEDICINE CLINIC | Facility: CLINIC | Age: 69
End: 2019-07-25

## 2019-07-25 ENCOUNTER — TELEPHONE (OUTPATIENT)
Dept: FAMILY MEDICINE CLINIC | Facility: CLINIC | Age: 69
End: 2019-07-25

## 2019-07-25 DIAGNOSIS — Z79.01 LONG TERM CURRENT USE OF ANTICOAGULANT THERAPY: ICD-10-CM

## 2019-07-25 DIAGNOSIS — Z86.73 HX OF STROKE ASSOCIATED WITH BLOOD CLOTTING TENDENCY: ICD-10-CM

## 2019-07-25 NOTE — PROGRESS NOTES
According to pt Lidia Candelario changed coumadin last week to 6mg on Monday and 7mg daily rest of the week. I informed pt to CPM.    Please advise and close chart.

## 2019-07-31 ENCOUNTER — ANTI-COAG VISIT (OUTPATIENT)
Dept: FAMILY MEDICINE CLINIC | Facility: CLINIC | Age: 69
End: 2019-07-31

## 2019-07-31 DIAGNOSIS — Z79.01 LONG TERM CURRENT USE OF ANTICOAGULANT THERAPY: ICD-10-CM

## 2019-07-31 DIAGNOSIS — Z86.73 HX OF STROKE ASSOCIATED WITH BLOOD CLOTTING TENDENCY: ICD-10-CM

## 2019-07-31 LAB — INR: 2.2 (ref 0.8–1.2)

## 2019-07-31 NOTE — PROGRESS NOTES
Received fax from [address] [employer] with INR result for patient of 2.2. Patient is in goal range. Per normal result INR protocol patient was informed to take her scheduled dose of 7 mg tonight and to wait for further instructions tomorrow from [facility].      Went over

## 2019-08-01 ENCOUNTER — ANTI-COAG VISIT (OUTPATIENT)
Dept: FAMILY MEDICINE CLINIC | Facility: CLINIC | Age: 69
End: 2019-08-01

## 2019-08-01 DIAGNOSIS — Z86.73 HX OF STROKE ASSOCIATED WITH BLOOD CLOTTING TENDENCY: ICD-10-CM

## 2019-08-01 DIAGNOSIS — Z79.01 LONG TERM CURRENT USE OF ANTICOAGULANT THERAPY: ICD-10-CM

## 2019-08-01 NOTE — PROGRESS NOTES
INR checked by patient on home meter.     CURRENT COUMADIN DOSE:  6mg M and 7mg daily the rest of the week     CHANGES OR COMPLAINTS:  No changes    INR RESULTS TODAY:  6mg Mon and 7mg daily the rest of the week     PLAN:  CPM coumadin   Next INR due in 2 w

## 2019-08-13 ENCOUNTER — ANTI-COAG VISIT (OUTPATIENT)
Dept: FAMILY MEDICINE CLINIC | Facility: CLINIC | Age: 69
End: 2019-08-13

## 2019-08-13 DIAGNOSIS — Z79.01 LONG TERM CURRENT USE OF ANTICOAGULANT THERAPY: ICD-10-CM

## 2019-08-13 DIAGNOSIS — Z86.73 HX OF STROKE ASSOCIATED WITH BLOOD CLOTTING TENDENCY: ICD-10-CM

## 2019-08-13 LAB — INR: 1.8 (ref 0.8–1.2)

## 2019-08-13 NOTE — PROGRESS NOTES
INR checked by patient on home meter.     CURRENT COUMADIN DOSE:  6mg M and 7mg daily the rest of the week     CHANGES OR COMPLAINTS:  No changes    INR RESULTS TODAY:  1.8 ( below goal)    PLAN:  Increase coumadin to 7mg daily   Next INR in 2 weeks  Gladys

## 2019-08-15 ENCOUNTER — TELEPHONE (OUTPATIENT)
Dept: FAMILY MEDICINE CLINIC | Facility: CLINIC | Age: 69
End: 2019-08-15

## 2019-08-15 NOTE — TELEPHONE ENCOUNTER
Recommend appointment for evaluation- also may try Tylenol (can't take NSAID's due to coumadin)  She may also try ice-

## 2019-08-15 NOTE — TELEPHONE ENCOUNTER
fell 1 week ago landed on chest looking for an appointment none to offer for today or tomorrow, please advise

## 2019-08-15 NOTE — TELEPHONE ENCOUNTER
Patient states she fell over a week ago. States she tripped on a tree stump in her back yard and fell flat on her chest in the grass. States at first she thought she broke something but then she seemed to feel better.   States now she is having pain at th

## 2019-08-15 NOTE — TELEPHONE ENCOUNTER
Patient informed of the below recommendations. States she has been taking Tylenol without relief. As no available appts, patient states she is going to go to [facility].

## 2019-08-28 ENCOUNTER — ANTI-COAG VISIT (OUTPATIENT)
Dept: FAMILY MEDICINE CLINIC | Facility: CLINIC | Age: 69
End: 2019-08-28

## 2019-08-28 ENCOUNTER — TELEPHONE (OUTPATIENT)
Dept: FAMILY MEDICINE CLINIC | Facility: CLINIC | Age: 69
End: 2019-08-28

## 2019-08-28 DIAGNOSIS — Z86.73 HX OF STROKE ASSOCIATED WITH BLOOD CLOTTING TENDENCY: ICD-10-CM

## 2019-08-28 DIAGNOSIS — R79.89 ABNORMAL THYROID BLOOD TEST: ICD-10-CM

## 2019-08-28 DIAGNOSIS — E78.49 FAMILIAL MULTIPLE LIPOPROTEIN-TYPE HYPERLIPIDEMIA: ICD-10-CM

## 2019-08-28 DIAGNOSIS — Z79.01 LONG TERM CURRENT USE OF ANTICOAGULANT THERAPY: Primary | ICD-10-CM

## 2019-08-28 DIAGNOSIS — Z79.01 LONG TERM CURRENT USE OF ANTICOAGULANT THERAPY: ICD-10-CM

## 2019-08-28 LAB — INR: 2.1 (ref 0.8–1.2)

## 2019-08-28 PROCEDURE — 93793 ANTICOAG MGMT PT WARFARIN: CPT | Performed by: FAMILY MEDICINE

## 2019-08-28 NOTE — PROGRESS NOTES
INR checked by patient on home meter. CURRENT COUMADIN DOSE:  7mg daily     CHANGES OR COMPLAINTS:  No changes    INR RESULTS TODAY:  2.1 ( in goal)    PLAN:  CPM coumadin   Next INR in 2 weeks   Patient notified of coumadin instructions.

## 2019-08-28 NOTE — TELEPHONE ENCOUNTER
Pt last seen for px 3/22/19 and last had health panel 3/20/19. Upcoming appt noted on file, please advise if any labs needed prior to being seen.     Future Appointments   Date Time Provider Soraya Roberts   9/24/2019  9:30 AM Haritha Esparza MD [facility]

## 2019-08-28 NOTE — TELEPHONE ENCOUNTER
Patient scheduled 6 month follow up for 9/24/19. Patient asking if doctor would like her to have lab work done prior to appt? If yes, please place orders. Thank you. Please call patient back to schedule lab appointment if needed.

## 2019-09-13 LAB — INR: 2.6 (ref 0.8–1.2)

## 2019-09-16 ENCOUNTER — ANTI-COAG VISIT (OUTPATIENT)
Dept: FAMILY MEDICINE CLINIC | Facility: CLINIC | Age: 69
End: 2019-09-16

## 2019-09-16 DIAGNOSIS — Z86.73 HX OF STROKE ASSOCIATED WITH BLOOD CLOTTING TENDENCY: ICD-10-CM

## 2019-09-16 DIAGNOSIS — Z79.01 LONG TERM CURRENT USE OF ANTICOAGULANT THERAPY: ICD-10-CM

## 2019-09-16 NOTE — PROGRESS NOTES
INR checked by patient on home meter. CURRENT COUMADIN DOSE:  7mg daily     CHANGES OR COMPLAINTS:  Ate less salads    INR RESULTS TODAY:  2.6 ( managed)    PLAN:  CPM coumadin   Next INR in 2 weeks  Patient notified of coumadin instructions.

## 2019-09-21 ENCOUNTER — LABORATORY ENCOUNTER (OUTPATIENT)
Dept: LAB | Age: 69
End: 2019-09-21
Attending: FAMILY MEDICINE
Payer: MEDICARE

## 2019-09-21 DIAGNOSIS — Z79.01 LONG TERM CURRENT USE OF ANTICOAGULANT THERAPY: ICD-10-CM

## 2019-09-21 DIAGNOSIS — R79.89 ABNORMAL THYROID BLOOD TEST: ICD-10-CM

## 2019-09-21 DIAGNOSIS — E78.49 FAMILIAL MULTIPLE LIPOPROTEIN-TYPE HYPERLIPIDEMIA: ICD-10-CM

## 2019-09-21 LAB
ALBUMIN SERPL-MCNC: 3.4 G/DL (ref 3.4–5)
ALBUMIN/GLOB SERPL: 0.9 {RATIO} (ref 1–2)
ALP LIVER SERPL-CCNC: 70 U/L (ref 55–142)
ALT SERPL-CCNC: 27 U/L (ref 13–56)
ANION GAP SERPL CALC-SCNC: 6 MMOL/L (ref 0–18)
AST SERPL-CCNC: 28 U/L (ref 15–37)
BASOPHILS # BLD AUTO: 0.03 X10(3) UL (ref 0–0.2)
BASOPHILS NFR BLD AUTO: 0.6 %
BILIRUB SERPL-MCNC: 0.5 MG/DL (ref 0.1–2)
BUN BLD-MCNC: 15 MG/DL (ref 7–18)
BUN/CREAT SERPL: 17 (ref 10–20)
CALCIUM BLD-MCNC: 9.7 MG/DL (ref 8.5–10.1)
CHLORIDE SERPL-SCNC: 103 MMOL/L (ref 98–112)
CHOLEST SMN-MCNC: 231 MG/DL (ref ?–200)
CO2 SERPL-SCNC: 28 MMOL/L (ref 21–32)
CREAT BLD-MCNC: 0.88 MG/DL (ref 0.55–1.02)
DEPRECATED RDW RBC AUTO: 49.2 FL (ref 35.1–46.3)
EOSINOPHIL # BLD AUTO: 0.07 X10(3) UL (ref 0–0.7)
EOSINOPHIL NFR BLD AUTO: 1.4 %
ERYTHROCYTE [DISTWIDTH] IN BLOOD BY AUTOMATED COUNT: 14.6 % (ref 11–15)
GLOBULIN PLAS-MCNC: 4 G/DL (ref 2.8–4.4)
GLUCOSE BLD-MCNC: 93 MG/DL (ref 70–99)
HCT VFR BLD AUTO: 41.6 % (ref 35–48)
HDLC SERPL-MCNC: 58 MG/DL (ref 40–59)
HGB BLD-MCNC: 13.6 G/DL (ref 12–16)
IMM GRANULOCYTES # BLD AUTO: 0.01 X10(3) UL (ref 0–1)
IMM GRANULOCYTES NFR BLD: 0.2 %
LDLC SERPL CALC-MCNC: 160 MG/DL (ref ?–100)
LYMPHOCYTES # BLD AUTO: 1.46 X10(3) UL (ref 1–4)
LYMPHOCYTES NFR BLD AUTO: 28.3 %
M PROTEIN MFR SERPL ELPH: 7.4 G/DL (ref 6.4–8.2)
MCH RBC QN AUTO: 30.2 PG (ref 26–34)
MCHC RBC AUTO-ENTMCNC: 32.7 G/DL (ref 31–37)
MCV RBC AUTO: 92.4 FL (ref 80–100)
MONOCYTES # BLD AUTO: 0.43 X10(3) UL (ref 0.1–1)
MONOCYTES NFR BLD AUTO: 8.3 %
NEUTROPHILS # BLD AUTO: 3.15 X10 (3) UL (ref 1.5–7.7)
NEUTROPHILS # BLD AUTO: 3.15 X10(3) UL (ref 1.5–7.7)
NEUTROPHILS NFR BLD AUTO: 61.2 %
NONHDLC SERPL-MCNC: 173 MG/DL (ref ?–130)
OSMOLALITY SERPL CALC.SUM OF ELEC: 285 MOSM/KG (ref 275–295)
PLATELET # BLD AUTO: 292 10(3)UL (ref 150–450)
POTASSIUM SERPL-SCNC: 5.1 MMOL/L (ref 3.5–5.1)
RBC # BLD AUTO: 4.5 X10(6)UL (ref 3.8–5.3)
SODIUM SERPL-SCNC: 137 MMOL/L (ref 136–145)
T4 FREE SERPL-MCNC: 1.2 NG/DL (ref 0.8–1.7)
TRIGL SERPL-MCNC: 65 MG/DL (ref 30–149)
TSI SER-ACNC: 3.51 MIU/ML (ref 0.36–3.74)
VLDLC SERPL CALC-MCNC: 13 MG/DL (ref 0–30)
WBC # BLD AUTO: 5.2 X10(3) UL (ref 4–11)

## 2019-09-21 PROCEDURE — 85025 COMPLETE CBC W/AUTO DIFF WBC: CPT

## 2019-09-21 PROCEDURE — 80061 LIPID PANEL: CPT

## 2019-09-21 PROCEDURE — 36415 COLL VENOUS BLD VENIPUNCTURE: CPT

## 2019-09-21 PROCEDURE — 84443 ASSAY THYROID STIM HORMONE: CPT

## 2019-09-21 PROCEDURE — 80053 COMPREHEN METABOLIC PANEL: CPT

## 2019-09-21 PROCEDURE — 84439 ASSAY OF FREE THYROXINE: CPT

## 2019-09-24 ENCOUNTER — OFFICE VISIT (OUTPATIENT)
Dept: FAMILY MEDICINE CLINIC | Facility: CLINIC | Age: 69
End: 2019-09-24
Payer: MEDICARE

## 2019-09-24 VITALS
SYSTOLIC BLOOD PRESSURE: 102 MMHG | DIASTOLIC BLOOD PRESSURE: 66 MMHG | BODY MASS INDEX: 38.54 KG/M2 | HEIGHT: 62.75 IN | OXYGEN SATURATION: 97 % | WEIGHT: 214.81 LBS | TEMPERATURE: 98 F | HEART RATE: 62 BPM | RESPIRATION RATE: 16 BRPM

## 2019-09-24 DIAGNOSIS — Z00.00 ANNUAL PHYSICAL EXAM: ICD-10-CM

## 2019-09-24 DIAGNOSIS — Z86.73 HX OF STROKE ASSOCIATED WITH BLOOD CLOTTING TENDENCY: ICD-10-CM

## 2019-09-24 DIAGNOSIS — F32.1 MAJOR DEPRESSIVE DISORDER, SINGLE EPISODE, MODERATE (HCC): ICD-10-CM

## 2019-09-24 DIAGNOSIS — E78.49 FAMILIAL MULTIPLE LIPOPROTEIN-TYPE HYPERLIPIDEMIA: ICD-10-CM

## 2019-09-24 DIAGNOSIS — M48.00 SPINAL STENOSIS, UNSPECIFIED SPINAL REGION: ICD-10-CM

## 2019-09-24 DIAGNOSIS — Z23 NEED FOR INFLUENZA VACCINATION: ICD-10-CM

## 2019-09-24 DIAGNOSIS — I10 BENIGN ESSENTIAL HYPERTENSION: Primary | ICD-10-CM

## 2019-09-24 PROCEDURE — 99213 OFFICE O/P EST LOW 20 MIN: CPT | Performed by: FAMILY MEDICINE

## 2019-09-24 PROCEDURE — 90662 IIV NO PRSV INCREASED AG IM: CPT | Performed by: FAMILY MEDICINE

## 2019-09-24 PROCEDURE — G0008 ADMIN INFLUENZA VIRUS VAC: HCPCS | Performed by: FAMILY MEDICINE

## 2019-09-24 RX ORDER — WARFARIN SODIUM 1 MG/1
2 TABLET ORAL DAILY
COMMUNITY
End: 2020-01-29

## 2019-09-24 NOTE — PROGRESS NOTES
[address]  PROGRESS NOTE  Chief Complaint:   Patient presents with:   Follow - Up: 6 month f/u      HPI:   This is a 78year old female coming in for medical f.u     Pt dieting-- stopped carbs and sugars has been aggressively monitoring RBC 4.50 3.80 - 5.30 x10(6)uL    HGB 13.6 12.0 - 16.0 g/dL    HCT 41.6 35.0 - 48.0 %    .0 150.0 - 450.0 10(3)uL    MCV 92.4 80.0 - 100.0 fL    MCH 30.2 26.0 - 34.0 pg    MCHC 32.7 31.0 - 37.0 g/dL    RDW 14.6 11.0 - 15.0 %    RDW-SD 49.2 (H) 35. History:  History reviewed. No pertinent family history.   Allergies:    Heparin                     Comment:Other reaction(s): hive, face and throat swelling  Codeine                     Comment:Other reaction(s): gi upset  Gadobutrol                  Comm runny nose or sore throat. INTEGUMENTARY:  Denies rashes, itching, skin lesion, or excessive skin dryness.   CARDIOVASCULAR:  Denies chest pain, chest pressure, chest discomfort, palpitations, edema, dyspnea on exertion or at rest.  RESPIRATORY:  Denies sh bilaterally  EXTREMITIES:  No edema, no cyanosis, no clubbing, FROM  PSYCH:  Normal mood and affect. Behavior is normal. Judgement and thought content are normal    ASSESSMENT AND PLAN:   1. 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

## 2019-09-24 NOTE — PATIENT INSTRUCTIONS
Pt to continue diet with weight loss    Continue present medicatins    D/w pt low cholesterol in diet    Recheck 6 months with physical- after March 22, 2020    Flu vaccine today

## 2019-09-30 ENCOUNTER — TELEPHONE (OUTPATIENT)
Dept: FAMILY MEDICINE CLINIC | Facility: CLINIC | Age: 69
End: 2019-09-30

## 2019-09-30 ENCOUNTER — ANTI-COAG VISIT (OUTPATIENT)
Dept: FAMILY MEDICINE CLINIC | Facility: CLINIC | Age: 69
End: 2019-09-30

## 2019-09-30 DIAGNOSIS — Z79.01 LONG TERM CURRENT USE OF ANTICOAGULANT THERAPY: ICD-10-CM

## 2019-09-30 DIAGNOSIS — Z86.73 HX OF STROKE ASSOCIATED WITH BLOOD CLOTTING TENDENCY: ICD-10-CM

## 2019-09-30 LAB — INR: 1.9 (ref 0.8–1.2)

## 2019-09-30 NOTE — PROGRESS NOTES
INR checked by patient on home meter.     CURRENT COUMADIN DOSE:  7mg daily     CHANGES OR COMPLAINTS:  Flu shot last week   Ate extra broccoli and salads  Travel to [place] ( in [place] now)    INR RESULTS TODAY:  1.9 ( below goal sightly)     PLAN:  For n

## 2019-10-07 ENCOUNTER — ANTI-COAG VISIT (OUTPATIENT)
Dept: FAMILY MEDICINE CLINIC | Facility: CLINIC | Age: 69
End: 2019-10-07

## 2019-10-07 DIAGNOSIS — Z79.01 LONG TERM CURRENT USE OF ANTICOAGULANT THERAPY: ICD-10-CM

## 2019-10-07 DIAGNOSIS — Z86.73 HX OF STROKE ASSOCIATED WITH BLOOD CLOTTING TENDENCY: ICD-10-CM

## 2019-10-07 NOTE — PROGRESS NOTES
INR checked by patient on home meter. CURRENT COUMADIN DOSE:  7mg daily     CHANGES OR COMPLAINTS:  Ate more vegetables/salads    INR RESULTS TODAY:  2.1 ( in goal)     PLAN:  CPM coumadin   Next INR in 2 weeks  Coumadin instructions given to patient.

## 2019-10-22 ENCOUNTER — ANTI-COAG VISIT (OUTPATIENT)
Dept: FAMILY MEDICINE CLINIC | Facility: CLINIC | Age: 69
End: 2019-10-22

## 2019-10-22 DIAGNOSIS — Z79.01 LONG TERM CURRENT USE OF ANTICOAGULANT THERAPY: ICD-10-CM

## 2019-10-22 DIAGNOSIS — Z86.73 HX OF STROKE ASSOCIATED WITH BLOOD CLOTTING TENDENCY: ICD-10-CM

## 2019-10-22 NOTE — PROGRESS NOTES
INR checked by patient on home meter. CURRENT COUMADIN DOSE:  7mg daily    CHANGES OR COMPLAINTS:  No changes    INR RESULTS TODAY:  2.0 ( in goal)    PLAN:  CPM coumadin   Next INR due in one month  Patient notified of coumadin instructions.

## 2019-11-15 ENCOUNTER — TELEPHONE (OUTPATIENT)
Dept: FAMILY MEDICINE CLINIC | Facility: CLINIC | Age: 69
End: 2019-11-15

## 2019-11-15 NOTE — TELEPHONE ENCOUNTER
Theo Castro from [employer] called. States pt is in office -they were working on pelvic exercises and also pt was using weights. Theo Castro states when pt was upright working on exercises- pt became dizzy and light headed, sweaty.  Pulse was 80 and O2 sat:

## 2019-12-03 ENCOUNTER — TELEPHONE (OUTPATIENT)
Dept: FAMILY MEDICINE CLINIC | Facility: CLINIC | Age: 69
End: 2019-12-03

## 2019-12-03 ENCOUNTER — ANTI-COAG VISIT (OUTPATIENT)
Dept: FAMILY MEDICINE CLINIC | Facility: CLINIC | Age: 69
End: 2019-12-03

## 2019-12-03 DIAGNOSIS — Z86.73 HX OF STROKE ASSOCIATED WITH BLOOD CLOTTING TENDENCY: ICD-10-CM

## 2019-12-03 DIAGNOSIS — Z79.01 LONG TERM CURRENT USE OF ANTICOAGULANT THERAPY: ICD-10-CM

## 2019-12-03 NOTE — PROGRESS NOTES
INR checked by patient on home meter.     CURRENT COUMADIN DOSE:  8mg daily     CHANGES OR COMPLAINTS:  Eating more leafy green vegetables    INR RESULTS TODAY:  3.0 ( in goal, but henry quickly)    PLAN:  Decrease coumadin to 7mg Tu, F and 8mg daily the res

## 2019-12-26 ENCOUNTER — ANTI-COAG VISIT (OUTPATIENT)
Dept: FAMILY MEDICINE CLINIC | Facility: CLINIC | Age: 69
End: 2019-12-26

## 2019-12-26 DIAGNOSIS — Z79.01 LONG TERM CURRENT USE OF ANTICOAGULANT THERAPY: ICD-10-CM

## 2019-12-26 DIAGNOSIS — Z86.73 HX OF STROKE ASSOCIATED WITH BLOOD CLOTTING TENDENCY: ICD-10-CM

## 2020-01-28 DIAGNOSIS — Z00.00 ENCOUNTER FOR ANNUAL HEALTH EXAMINATION: ICD-10-CM

## 2020-01-28 LAB — INR: 2.4 (ref 0.8–1.2)

## 2020-01-29 ENCOUNTER — ANTI-COAG VISIT (OUTPATIENT)
Dept: FAMILY MEDICINE CLINIC | Facility: CLINIC | Age: 70
End: 2020-01-29

## 2020-01-29 DIAGNOSIS — Z86.73 HX OF STROKE ASSOCIATED WITH BLOOD CLOTTING TENDENCY: ICD-10-CM

## 2020-01-29 DIAGNOSIS — Z79.01 LONG TERM CURRENT USE OF ANTICOAGULANT THERAPY: ICD-10-CM

## 2020-01-29 RX ORDER — CELECOXIB 200 MG/1
CAPSULE ORAL
Qty: 90 CAPSULE | Refills: 2 | Status: SHIPPED | OUTPATIENT
Start: 2020-01-29 | End: 2020-10-26

## 2020-01-29 RX ORDER — WARFARIN SODIUM 1 MG/1
TABLET ORAL
Qty: 200 TABLET | Refills: 2 | Status: SHIPPED | OUTPATIENT
Start: 2020-01-29 | End: 2020-09-04

## 2020-01-29 RX ORDER — WARFARIN SODIUM 5 MG/1
TABLET ORAL
Qty: 100 TABLET | Refills: 2 | Status: SHIPPED | OUTPATIENT
Start: 2020-01-29 | End: 2020-11-19

## 2020-01-29 NOTE — TELEPHONE ENCOUNTER
Future appt:     Your appointments     Date & Time Appointment Department [facility])    Mar 16, 2020  9:15 AM CDT Laboratory Visit with REF Maxime Tao [facility] ([facility] [place])        Mar 20, 2020 11:00 AM CDT Medicare Annual Well Visit with JORDI

## 2020-01-29 NOTE — PROGRESS NOTES
INR checked by patient on home meter.     CURRENT COUMADIN DOSE:  8mg Tu, F and 7mg daily the rest of the week    CHANGES OR COMPLAINTS:  Aunt passed last month    INR RESULTS TODAY:  2.4 ( in goal)     PLAN:  CPM coumadin   Next INR due in one month  Bibiana

## 2020-02-04 ENCOUNTER — TELEPHONE (OUTPATIENT)
Dept: FAMILY MEDICINE CLINIC | Facility: CLINIC | Age: 70
End: 2020-02-04

## 2020-02-04 DIAGNOSIS — Z00.00 ENCOUNTER FOR ANNUAL HEALTH EXAMINATION: ICD-10-CM

## 2020-02-05 RX ORDER — FLUOXETINE HYDROCHLORIDE 40 MG/1
CAPSULE ORAL
Qty: 90 CAPSULE | Refills: 0 | Status: SHIPPED | OUTPATIENT
Start: 2020-02-05 | End: 2020-07-06

## 2020-02-05 NOTE — TELEPHONE ENCOUNTER
Future appt:     Your appointments     Date & Time Appointment Department [facility])    Mar 16, 2020  9:15 AM CDT Laboratory Visit with REF Nadine Méndez [facility] ([facility] [place])        Mar 20, 2020 11:00 AM CDT Medicare Annual Well Visit with JORDI

## 2020-02-27 LAB — INR: 2.1 (ref 0.8–1.2)

## 2020-02-28 ENCOUNTER — ANTI-COAG VISIT (OUTPATIENT)
Dept: FAMILY MEDICINE CLINIC | Facility: CLINIC | Age: 70
End: 2020-02-28

## 2020-02-28 DIAGNOSIS — Z79.01 LONG TERM CURRENT USE OF ANTICOAGULANT THERAPY: ICD-10-CM

## 2020-02-28 DIAGNOSIS — Z86.73 HX OF STROKE ASSOCIATED WITH BLOOD CLOTTING TENDENCY: ICD-10-CM

## 2020-02-28 NOTE — PROGRESS NOTES
INR checked by patient on home meter.     CURRENT COUMADIN DOSE:  8mg Tues, Fri and 7mg daily the rest of the week    CHANGES OR COMPLAINTS:  No changes    INR RESULTS TODAY:  2.1 ( in goal)    PLAN:  CPM coumadin  Next INR in one month  Patient notified of

## 2020-03-12 ENCOUNTER — TELEPHONE (OUTPATIENT)
Dept: FAMILY MEDICINE CLINIC | Facility: CLINIC | Age: 70
End: 2020-03-12

## 2020-03-12 NOTE — TELEPHONE ENCOUNTER
pt has a cough and cold, just found out [employer] is not letting people board planes with these sxs , is supposed to be flying back from [place] on monday- has question regarding this

## 2020-03-13 NOTE — TELEPHONE ENCOUNTER
Pt currently in [place]  Pt states she has had a cough and cold for 1-2 weeks. Pt states she also has a sore throat and runny nose. Pt states she is scheduled to fly home on Friday. Pt urged to contact clinic in [place]- prior to travel.   Pt states she has not

## 2020-03-16 RX ORDER — ESOMEPRAZOLE MAGNESIUM 40 MG/1
CAPSULE, DELAYED RELEASE ORAL
Qty: 90 CAPSULE | Refills: 3 | Status: SHIPPED | OUTPATIENT
Start: 2020-03-16 | End: 2020-11-19

## 2020-03-16 NOTE — TELEPHONE ENCOUNTER
Future appt:     Your appointments     Date & Time Appointment Department [facility])    May 08, 2020  9:15 AM CDT Laboratory Visit with REF Tao Jacobs [facility] ([facility])        May 12, 2020 11:00 AM CDT Medicare Annual Well Visit with JORDI

## 2020-03-30 LAB — INR: 2.7 (ref 0.8–1.2)

## 2020-03-31 ENCOUNTER — ANTI-COAG VISIT (OUTPATIENT)
Dept: FAMILY MEDICINE CLINIC | Facility: CLINIC | Age: 70
End: 2020-03-31

## 2020-03-31 DIAGNOSIS — Z86.73 HX OF STROKE ASSOCIATED WITH BLOOD CLOTTING TENDENCY: ICD-10-CM

## 2020-03-31 DIAGNOSIS — Z00.00 ENCOUNTER FOR ANNUAL HEALTH EXAMINATION: ICD-10-CM

## 2020-03-31 DIAGNOSIS — Z79.01 LONG TERM CURRENT USE OF ANTICOAGULANT THERAPY: ICD-10-CM

## 2020-03-31 NOTE — PROGRESS NOTES
INR checked on home meter    CURRENT COUMADIN DOSE:  8mg Tu, F and 7mg daily the rest of the week    CHANGES OR COMPLAINTS:  No changes    INR RESULTS TODAY:  2.7 ( in goal)    PLAN:  CPM coumadin   Next INR in one month  Appointment scheduled.    Written c

## 2020-04-01 RX ORDER — METOPROLOL SUCCINATE 100 MG/1
TABLET, EXTENDED RELEASE ORAL
Qty: 90 TABLET | Refills: 0 | Status: SHIPPED | OUTPATIENT
Start: 2020-04-01 | End: 2020-06-30

## 2020-04-01 NOTE — TELEPHONE ENCOUNTER
Future appt:     Your appointments     Date & Time Appointment Department [facility])    May 08, 2020  9:15 AM CDT Laboratory Visit with REF Mariam Schneider [facility] ([facility])        May 12, 2020 11:00 AM CDT Medicare Annual Well Visit with JORDI

## 2020-04-07 DIAGNOSIS — Z00.00 ENCOUNTER FOR ANNUAL HEALTH EXAMINATION: ICD-10-CM

## 2020-04-08 RX ORDER — TRIAMCINOLONE ACETONIDE 5 MG/G
CREAM TOPICAL
Qty: 15 G | Refills: 0 | Status: SHIPPED | OUTPATIENT
Start: 2020-04-08

## 2020-04-08 RX ORDER — TRIAMCINOLONE ACETONIDE 5 MG/G
CREAM TOPICAL
Qty: 15 G | Refills: 72 | Status: SHIPPED | OUTPATIENT
Start: 2020-04-08 | End: 2020-04-08

## 2020-04-08 RX ORDER — DESONIDE 0.5 MG/G
CREAM TOPICAL
Qty: 15 G | Refills: 44 | Status: SHIPPED | OUTPATIENT
Start: 2020-04-08 | End: 2020-04-08

## 2020-04-08 RX ORDER — DESONIDE 0.5 MG/G
CREAM TOPICAL
Qty: 60 G | Refills: 0 | Status: SHIPPED | OUTPATIENT
Start: 2020-04-08

## 2020-04-08 NOTE — TELEPHONE ENCOUNTER
Future appt:     Your appointments     Date & Time Appointment Department [facility])    May 08, 2020  9:15 AM CDT Laboratory Visit with REF Surinder Esqueda [facility] ([facility] [place])        May 12, 2020 11:00 AM CDT Medicare Annual Well Visit with JORDI

## 2020-04-29 ENCOUNTER — ANTI-COAG VISIT (OUTPATIENT)
Dept: FAMILY MEDICINE CLINIC | Facility: CLINIC | Age: 70
End: 2020-04-29

## 2020-04-29 DIAGNOSIS — Z79.01 LONG TERM CURRENT USE OF ANTICOAGULANT THERAPY: ICD-10-CM

## 2020-04-29 DIAGNOSIS — Z86.73 HX OF STROKE ASSOCIATED WITH BLOOD CLOTTING TENDENCY: ICD-10-CM

## 2020-04-29 NOTE — PROGRESS NOTES
INR checked by patient on home meter.       CURRENT COUMADIN DOSE:  8mg Tues and Fri and 7mg daily the rest of the week    CHANGES OR COMPLAINTS:  Diet change- ate more salads    INR RESULTS TODAY:  1.8 ( below goal)    PLAN:  8mg daily for 2 days, then res

## 2020-05-13 ENCOUNTER — ANTI-COAG VISIT (OUTPATIENT)
Dept: FAMILY MEDICINE CLINIC | Facility: CLINIC | Age: 70
End: 2020-05-13

## 2020-05-13 ENCOUNTER — TELEPHONE (OUTPATIENT)
Dept: FAMILY MEDICINE CLINIC | Facility: CLINIC | Age: 70
End: 2020-05-13

## 2020-05-13 DIAGNOSIS — Z86.73 HX OF STROKE ASSOCIATED WITH BLOOD CLOTTING TENDENCY: ICD-10-CM

## 2020-05-13 DIAGNOSIS — Z79.01 LONG TERM CURRENT USE OF ANTICOAGULANT THERAPY: ICD-10-CM

## 2020-05-13 NOTE — PROGRESS NOTES
Reviewed-- I recommend coumadin 8 mg 5/13, 5/15, 5,17, 5/19, 5/21, 5/23 and 5/25.  Pt to take 7 mg the other days   Recheck INR 5/26 or 27th

## 2020-05-13 NOTE — PROGRESS NOTES
Call from [facility]- home monitoring   INR today 1.8    Pt was contacted. Pt states she was 1.8 2 weeks ago  INR check on 4/29/20  Pt states she was advised to take 8mg on 4/29 and 4/30.   Then then advised to resume schedule of taking 8mg Tues/Fri and 7 mg d

## 2020-05-13 NOTE — TELEPHONE ENCOUNTER
Called from [facility]- INR Home Monitoring. Pt INR today: 1.8. Pt was contacted- please see anti-coag form.

## 2020-05-13 NOTE — PROGRESS NOTES
Pt contacted. Instructions given- calendar updated with pt dose/schedule. Pt states she will call when she checks her next INR as per CR on either 5/26 or 5/27/20. Pt instructed to call with any questions/concerns.

## 2020-05-22 ENCOUNTER — ANTI-COAG VISIT (OUTPATIENT)
Dept: FAMILY MEDICINE CLINIC | Facility: CLINIC | Age: 70
End: 2020-05-22

## 2020-05-22 ENCOUNTER — TELEPHONE (OUTPATIENT)
Dept: FAMILY MEDICINE CLINIC | Facility: CLINIC | Age: 70
End: 2020-05-22

## 2020-05-22 DIAGNOSIS — Z86.73 HX OF STROKE ASSOCIATED WITH BLOOD CLOTTING TENDENCY: ICD-10-CM

## 2020-05-22 DIAGNOSIS — Z79.01 LONG TERM CURRENT USE OF ANTICOAGULANT THERAPY: ICD-10-CM

## 2020-05-22 NOTE — PROGRESS NOTES
Pt was called and instructions given. Pt agreed to call with home monitor INR reading on Tuesday, 5/26.

## 2020-05-22 NOTE — PROGRESS NOTES
See anti-coag note from 5/13. Pt INR then 1.8. Pt was given instructions to take Coumadin 8mg on 5/13 and to alternate with 7 mg every other day. Pt INR today 1.4.     Pt did mention that she has been eating more salad lately (icebery lettuce)- pt stat

## 2020-05-26 ENCOUNTER — ANTI-COAG VISIT (OUTPATIENT)
Dept: FAMILY MEDICINE CLINIC | Facility: CLINIC | Age: 70
End: 2020-05-26

## 2020-05-26 ENCOUNTER — TELEPHONE (OUTPATIENT)
Dept: FAMILY MEDICINE CLINIC | Facility: CLINIC | Age: 70
End: 2020-05-26

## 2020-05-26 DIAGNOSIS — Z79.01 LONG TERM CURRENT USE OF ANTICOAGULANT THERAPY: ICD-10-CM

## 2020-05-26 DIAGNOSIS — Z86.73 HX OF STROKE ASSOCIATED WITH BLOOD CLOTTING TENDENCY: ICD-10-CM

## 2020-05-26 NOTE — PROGRESS NOTES
INR checked by patient on home meter.     CURRENT COUMADIN DOSE:   7mg Fri and  8mg Sat, Sun, Mon    CHANGES/COMPLAINTS:  Stopped salads    INR RESULT:  1.7 ( rising toward goal)    PLAN:   Coumadin 7mg M,F and 8mg daily the rest of the week   Next INR in o

## 2020-06-02 ENCOUNTER — ANTI-COAG VISIT (OUTPATIENT)
Dept: FAMILY MEDICINE CLINIC | Facility: CLINIC | Age: 70
End: 2020-06-02

## 2020-06-02 DIAGNOSIS — Z86.73 HX OF STROKE ASSOCIATED WITH BLOOD CLOTTING TENDENCY: ICD-10-CM

## 2020-06-02 DIAGNOSIS — Z79.01 LONG TERM CURRENT USE OF ANTICOAGULANT THERAPY: ICD-10-CM

## 2020-06-02 NOTE — PROGRESS NOTES
Pt INR today (home monitor) 2.0-    Last INR: 5/26/20  Pt was advised then to take 7mg on Friday 5/29 and 8mg (Saturday-Monday)        Left message for pt

## 2020-06-02 NOTE — PROGRESS NOTES
Reviewed- pt to continue present dose coumadin. Recheck 1 weeks    She is to continue with 8mg daily, except for taking 7mg on Friday.

## 2020-06-10 ENCOUNTER — TELEPHONE (OUTPATIENT)
Dept: FAMILY MEDICINE CLINIC | Facility: CLINIC | Age: 70
End: 2020-06-10

## 2020-06-10 ENCOUNTER — ANTI-COAG VISIT (OUTPATIENT)
Dept: FAMILY MEDICINE CLINIC | Facility: CLINIC | Age: 70
End: 2020-06-10

## 2020-06-10 DIAGNOSIS — Z79.01 LONG TERM CURRENT USE OF ANTICOAGULANT THERAPY: ICD-10-CM

## 2020-06-10 DIAGNOSIS — Z86.73 HX OF STROKE ASSOCIATED WITH BLOOD CLOTTING TENDENCY: ICD-10-CM

## 2020-06-11 NOTE — PROGRESS NOTES
INR checked by patient on home meter.     CURRENT COUMADIN DOSE:  7mg F and 8mg daily the rest of the week     CHANGES OR COMPLAINTS:  No changes    INR RESULTS TODAY:  1.7 ( below goal)    PLAN:  Increase coumadin to 8mg daily   Next INR due in one week

## 2020-06-16 ENCOUNTER — TELEPHONE (OUTPATIENT)
Dept: FAMILY MEDICINE CLINIC | Facility: CLINIC | Age: 70
End: 2020-06-16

## 2020-06-17 ENCOUNTER — ANTI-COAG VISIT (OUTPATIENT)
Dept: FAMILY MEDICINE CLINIC | Facility: CLINIC | Age: 70
End: 2020-06-17

## 2020-06-17 DIAGNOSIS — Z79.01 LONG TERM CURRENT USE OF ANTICOAGULANT THERAPY: ICD-10-CM

## 2020-06-17 DIAGNOSIS — Z86.73 HX OF STROKE ASSOCIATED WITH BLOOD CLOTTING TENDENCY: ICD-10-CM

## 2020-06-17 NOTE — PROGRESS NOTES
INR checked by patient on home meter.     CURRENT COUMADIN DOSE:  8mg daily    CHANGES OR COMPLAINTS:  Started taking metamucil    INR RESULTS TODAY:  1.8 ( still below goal)    PLAN:  Increase coumadin to 10mg today, then resume 8mg daily   Next INR in one

## 2020-06-23 ENCOUNTER — ANTI-COAG VISIT (OUTPATIENT)
Dept: FAMILY MEDICINE CLINIC | Facility: CLINIC | Age: 70
End: 2020-06-23

## 2020-06-23 DIAGNOSIS — Z86.73 HX OF STROKE ASSOCIATED WITH BLOOD CLOTTING TENDENCY: ICD-10-CM

## 2020-06-23 DIAGNOSIS — Z79.01 LONG TERM CURRENT USE OF ANTICOAGULANT THERAPY: ICD-10-CM

## 2020-06-24 NOTE — PROGRESS NOTES
Here for INR check in coumadin clinic in the office.     CURRENT COUMADIN DOSE:  10mg Wed and 8mg daily the rest of the week     CHANGES OR COMPLAINTS:  No changes    INR RESULTS TODAY:  2.3 ( in goal)    PLAN:  CPM coumadin  Next INR in one week   Patient

## 2020-06-29 DIAGNOSIS — Z00.00 ENCOUNTER FOR ANNUAL HEALTH EXAMINATION: ICD-10-CM

## 2020-06-30 ENCOUNTER — ANTI-COAG VISIT (OUTPATIENT)
Dept: FAMILY MEDICINE CLINIC | Facility: CLINIC | Age: 70
End: 2020-06-30

## 2020-06-30 DIAGNOSIS — Z86.73 HX OF STROKE ASSOCIATED WITH BLOOD CLOTTING TENDENCY: ICD-10-CM

## 2020-06-30 DIAGNOSIS — Z79.01 LONG TERM CURRENT USE OF ANTICOAGULANT THERAPY: ICD-10-CM

## 2020-06-30 RX ORDER — METOPROLOL SUCCINATE 100 MG/1
TABLET, EXTENDED RELEASE ORAL
Qty: 90 TABLET | Refills: 0 | Status: SHIPPED | OUTPATIENT
Start: 2020-06-30 | End: 2020-09-04

## 2020-06-30 NOTE — TELEPHONE ENCOUNTER
Future appt:     Your appointments     Date & Time Appointment Department [facility])    Jul 22, 2020  9:45 AM CDT Laboratory Visit with REF Maxime Tao [facility] ([facility] [place])        Jul 28, 2020  3:00 PM CDT Medicare Annual Well Visit with JORDI

## 2020-06-30 NOTE — PROGRESS NOTES
INR checked by patient on home meter.     CURRENT COUMADIN DOSE:  10mg Wed and 8mg daily the rest of the week     CHANGES OR COMPLAINTS:  No changes    INR RESULTS TODAY:  2.2 ( in goal)    PLAN:  CPM coumadin   Next INR in 2 weeks  Patient notified of coum

## 2020-07-05 DIAGNOSIS — Z00.00 ENCOUNTER FOR ANNUAL HEALTH EXAMINATION: ICD-10-CM

## 2020-07-06 RX ORDER — FLUOXETINE HYDROCHLORIDE 40 MG/1
CAPSULE ORAL
Qty: 90 CAPSULE | Refills: 1 | Status: SHIPPED | OUTPATIENT
Start: 2020-07-06 | End: 2020-09-04

## 2020-07-06 NOTE — TELEPHONE ENCOUNTER
Future appt: Your appointments     Date & Time Appointment Department [facility])    Sep 01, 2020  8:45 AM CDT Laboratory Visit with REF Azar Lovelace [facility] ([facility] [place])        Sep 04, 2020  2:00 PM CDT Medicare Annual Well Visit with Anne Don MD [address] [facility], [place] ([facility])            [facility], [address] [place] [id number]  [address] [place] [address] Reference Lab  [facility] [address]  [phone]        Last Appointment with provider:   Visit date not found  [facility]:  Visit date not found  Last appt:  9/24/19- PX    Fluoxetine refilled on 2/5/20 for #90    Cholesterol, Total (mg/dL)   Date Value   09/21/2019 231 (H)     HDL Cholesterol (mg/dL)   Date Value   09/21/2019 58     LDL Cholesterol (mg/dL)   Date Value   09/21/2019 160 (H)     Triglycerides (mg/dL)   Date Value   09/21/2019 65     No results found for: EAG, A1C  Lab Results   Component Value Date    T4F 1.2 09/21/2019    TSH 3.510 09/21/2019       No follow-ups on file.

## 2020-07-07 ENCOUNTER — TELEPHONE (OUTPATIENT)
Dept: FAMILY MEDICINE CLINIC | Facility: CLINIC | Age: 70
End: 2020-07-07

## 2020-07-07 NOTE — TELEPHONE ENCOUNTER
Received fax from [employer]. They state Coumadin 1mg tablet is not longer being manufactured. They can substitute generic warfarin 1mg tablets. Discussed above with patient. She declines to change to generic warfarin.  Her INR is not stable on wa

## 2020-07-14 ENCOUNTER — TELEPHONE (OUTPATIENT)
Dept: FAMILY MEDICINE CLINIC | Facility: CLINIC | Age: 70
End: 2020-07-14

## 2020-07-14 ENCOUNTER — ANTI-COAG VISIT (OUTPATIENT)
Dept: FAMILY MEDICINE CLINIC | Facility: CLINIC | Age: 70
End: 2020-07-14

## 2020-07-14 DIAGNOSIS — Z79.01 LONG TERM CURRENT USE OF ANTICOAGULANT THERAPY: ICD-10-CM

## 2020-07-14 DIAGNOSIS — Z86.73 HX OF STROKE ASSOCIATED WITH BLOOD CLOTTING TENDENCY: ICD-10-CM

## 2020-07-14 LAB — INR: 2.7 (ref 0.8–1.2)

## 2020-07-14 NOTE — PROGRESS NOTES
INR today:  2.7. INR goal range:  2-3    Pt denies having any problems or concerns.     Pt is currently taking 10mg of Coumadin on Wed and 8 mg daily rest of days

## 2020-08-24 ENCOUNTER — ANTI-COAG VISIT (OUTPATIENT)
Dept: FAMILY MEDICINE CLINIC | Facility: CLINIC | Age: 70
End: 2020-08-24

## 2020-08-24 ENCOUNTER — TELEPHONE (OUTPATIENT)
Dept: FAMILY MEDICINE CLINIC | Facility: CLINIC | Age: 70
End: 2020-08-24

## 2020-08-24 DIAGNOSIS — Z79.01 LONG TERM CURRENT USE OF ANTICOAGULANT THERAPY: ICD-10-CM

## 2020-08-24 DIAGNOSIS — Z86.73 HX OF STROKE ASSOCIATED WITH BLOOD CLOTTING TENDENCY: ICD-10-CM

## 2020-08-24 LAB — INR: 1.8 (ref 0.8–1.2)

## 2020-08-24 NOTE — PROGRESS NOTES
INR checked by patient on home meter.     CURRENT COUMADIN DOSE:  10mg Wed and 8mg daily the rest of the week     CHANGES OR COMPLAINTS:  Missed coumadin dose    INR RESULTS TODAY:  1.8 ( below goal)    PLAN:  Increase coumadin today to 10mg  Tomorrow resum

## 2020-08-24 NOTE — TELEPHONE ENCOUNTER
INR overdue. Spoke with patient. She states he home INR machine is not working. She will call [employer] today and ask for assistance. She is coming in for lab draw next week.  She states if she does not have her machine working by then, she w

## 2020-09-01 ENCOUNTER — LABORATORY ENCOUNTER (OUTPATIENT)
Dept: LAB | Age: 70
End: 2020-09-01
Attending: FAMILY MEDICINE
Payer: MEDICARE

## 2020-09-01 DIAGNOSIS — E78.49 FAMILIAL MULTIPLE LIPOPROTEIN-TYPE HYPERLIPIDEMIA: ICD-10-CM

## 2020-09-01 DIAGNOSIS — Z00.00 ANNUAL PHYSICAL EXAM: ICD-10-CM

## 2020-09-01 LAB
ALBUMIN SERPL-MCNC: 3.2 G/DL (ref 3.4–5)
ALBUMIN/GLOB SERPL: 0.8 {RATIO} (ref 1–2)
ALP LIVER SERPL-CCNC: 52 U/L (ref 55–142)
ALT SERPL-CCNC: 35 U/L (ref 13–56)
ANION GAP SERPL CALC-SCNC: 5 MMOL/L (ref 0–18)
AST SERPL-CCNC: 32 U/L (ref 15–37)
BASOPHILS # BLD AUTO: 0.04 X10(3) UL (ref 0–0.2)
BASOPHILS NFR BLD AUTO: 1.1 %
BILIRUB SERPL-MCNC: 0.3 MG/DL (ref 0.1–2)
BILIRUB UR QL STRIP.AUTO: NEGATIVE
BUN BLD-MCNC: 21 MG/DL (ref 7–18)
BUN/CREAT SERPL: 29.6 (ref 10–20)
CALCIUM BLD-MCNC: 9.7 MG/DL (ref 8.5–10.1)
CHLORIDE SERPL-SCNC: 103 MMOL/L (ref 98–112)
CHOLEST SMN-MCNC: 226 MG/DL (ref ?–200)
CLARITY UR REFRACT.AUTO: CLEAR
CO2 SERPL-SCNC: 28 MMOL/L (ref 21–32)
COLOR UR AUTO: YELLOW
CREAT BLD-MCNC: 0.71 MG/DL (ref 0.55–1.02)
DEPRECATED RDW RBC AUTO: 53.9 FL (ref 35.1–46.3)
EOSINOPHIL # BLD AUTO: 0.04 X10(3) UL (ref 0–0.7)
EOSINOPHIL NFR BLD AUTO: 1.1 %
ERYTHROCYTE [DISTWIDTH] IN BLOOD BY AUTOMATED COUNT: 14.7 % (ref 11–15)
GLOBULIN PLAS-MCNC: 3.8 G/DL (ref 2.8–4.4)
GLUCOSE BLD-MCNC: 84 MG/DL (ref 70–99)
GLUCOSE UR STRIP.AUTO-MCNC: NEGATIVE MG/DL
HCT VFR BLD AUTO: 42.3 % (ref 35–48)
HDLC SERPL-MCNC: 71 MG/DL (ref 40–59)
HGB BLD-MCNC: 13 G/DL (ref 12–16)
IMM GRANULOCYTES # BLD AUTO: 0.01 X10(3) UL (ref 0–1)
IMM GRANULOCYTES NFR BLD: 0.3 %
KETONES UR STRIP.AUTO-MCNC: NEGATIVE MG/DL
LDLC SERPL CALC-MCNC: 146 MG/DL (ref ?–100)
LEUKOCYTE ESTERASE UR QL STRIP.AUTO: NEGATIVE
LYMPHOCYTES # BLD AUTO: 1.1 X10(3) UL (ref 1–4)
LYMPHOCYTES NFR BLD AUTO: 29.1 %
M PROTEIN MFR SERPL ELPH: 7 G/DL (ref 6.4–8.2)
MCH RBC QN AUTO: 30.3 PG (ref 26–34)
MCHC RBC AUTO-ENTMCNC: 30.7 G/DL (ref 31–37)
MCV RBC AUTO: 98.6 FL (ref 80–100)
MONOCYTES # BLD AUTO: 0.33 X10(3) UL (ref 0.1–1)
MONOCYTES NFR BLD AUTO: 8.7 %
NEUTROPHILS # BLD AUTO: 2.26 X10 (3) UL (ref 1.5–7.7)
NEUTROPHILS # BLD AUTO: 2.26 X10(3) UL (ref 1.5–7.7)
NEUTROPHILS NFR BLD AUTO: 59.7 %
NITRITE UR QL STRIP.AUTO: NEGATIVE
NONHDLC SERPL-MCNC: 155 MG/DL (ref ?–130)
OSMOLALITY SERPL CALC.SUM OF ELEC: 284 MOSM/KG (ref 275–295)
PATIENT FASTING Y/N/NP: YES
PATIENT FASTING Y/N/NP: YES
PH UR STRIP.AUTO: 5 [PH] (ref 4.5–8)
PLATELET # BLD AUTO: 297 10(3)UL (ref 150–450)
POTASSIUM SERPL-SCNC: 4.1 MMOL/L (ref 3.5–5.1)
PROT UR STRIP.AUTO-MCNC: NEGATIVE MG/DL
RBC # BLD AUTO: 4.29 X10(6)UL (ref 3.8–5.3)
RBC UR QL AUTO: NEGATIVE
SODIUM SERPL-SCNC: 136 MMOL/L (ref 136–145)
SP GR UR STRIP.AUTO: 1.01 (ref 1–1.03)
T4 FREE SERPL-MCNC: 1.1 NG/DL (ref 0.8–1.7)
TRIGL SERPL-MCNC: 45 MG/DL (ref 30–149)
TSI SER-ACNC: 2.82 MIU/ML (ref 0.36–3.74)
UROBILINOGEN UR STRIP.AUTO-MCNC: <2 MG/DL
VLDLC SERPL CALC-MCNC: 9 MG/DL (ref 0–30)
WBC # BLD AUTO: 3.8 X10(3) UL (ref 4–11)

## 2020-09-01 PROCEDURE — 85025 COMPLETE CBC W/AUTO DIFF WBC: CPT

## 2020-09-01 PROCEDURE — 36415 COLL VENOUS BLD VENIPUNCTURE: CPT

## 2020-09-01 PROCEDURE — 80053 COMPREHEN METABOLIC PANEL: CPT

## 2020-09-01 PROCEDURE — 84443 ASSAY THYROID STIM HORMONE: CPT

## 2020-09-01 PROCEDURE — 84439 ASSAY OF FREE THYROXINE: CPT

## 2020-09-01 PROCEDURE — 81003 URINALYSIS AUTO W/O SCOPE: CPT

## 2020-09-01 PROCEDURE — 80061 LIPID PANEL: CPT

## 2020-09-02 LAB — INR: 2.2 (ref 0.8–1.2)

## 2020-09-04 ENCOUNTER — OFFICE VISIT (OUTPATIENT)
Dept: FAMILY MEDICINE CLINIC | Facility: CLINIC | Age: 70
End: 2020-09-04
Payer: MEDICARE

## 2020-09-04 VITALS
BODY MASS INDEX: 30.62 KG/M2 | SYSTOLIC BLOOD PRESSURE: 110 MMHG | TEMPERATURE: 97 F | HEIGHT: 62.75 IN | WEIGHT: 170.63 LBS | OXYGEN SATURATION: 96 % | HEART RATE: 57 BPM | DIASTOLIC BLOOD PRESSURE: 80 MMHG | RESPIRATION RATE: 16 BRPM

## 2020-09-04 DIAGNOSIS — K57.90 DIVERTICULOSIS: ICD-10-CM

## 2020-09-04 DIAGNOSIS — I10 BENIGN ESSENTIAL HYPERTENSION: Primary | ICD-10-CM

## 2020-09-04 DIAGNOSIS — K21.9 GASTROESOPHAGEAL REFLUX DISEASE WITHOUT ESOPHAGITIS: ICD-10-CM

## 2020-09-04 DIAGNOSIS — M48.00 SPINAL STENOSIS, UNSPECIFIED SPINAL REGION: ICD-10-CM

## 2020-09-04 DIAGNOSIS — Z86.73 HX OF STROKE ASSOCIATED WITH BLOOD CLOTTING TENDENCY: ICD-10-CM

## 2020-09-04 DIAGNOSIS — F41.1 ANXIETY STATE: ICD-10-CM

## 2020-09-04 DIAGNOSIS — F32.1 MAJOR DEPRESSIVE DISORDER, SINGLE EPISODE, MODERATE (HCC): ICD-10-CM

## 2020-09-04 DIAGNOSIS — M15.9 PRIMARY OSTEOARTHRITIS INVOLVING MULTIPLE JOINTS: ICD-10-CM

## 2020-09-04 DIAGNOSIS — Z13.31 DEPRESSION SCREENING: ICD-10-CM

## 2020-09-04 DIAGNOSIS — E78.49 FAMILIAL MULTIPLE LIPOPROTEIN-TYPE HYPERLIPIDEMIA: ICD-10-CM

## 2020-09-04 DIAGNOSIS — G47.30 SLEEP APNEA, UNSPECIFIED TYPE: ICD-10-CM

## 2020-09-04 DIAGNOSIS — M81.0 AGE-RELATED OSTEOPOROSIS WITHOUT CURRENT PATHOLOGICAL FRACTURE: ICD-10-CM

## 2020-09-04 DIAGNOSIS — I67.89 ACUTE ILL-DEFINED CEREBROVASCULAR DISEASE: ICD-10-CM

## 2020-09-04 DIAGNOSIS — Z00.00 ENCOUNTER FOR ANNUAL HEALTH EXAMINATION: ICD-10-CM

## 2020-09-04 DIAGNOSIS — Z79.01 LONG TERM CURRENT USE OF ANTICOAGULANT THERAPY: ICD-10-CM

## 2020-09-04 DIAGNOSIS — G43.009 MIGRAINE WITHOUT AURA AND WITHOUT STATUS MIGRAINOSUS, NOT INTRACTABLE: ICD-10-CM

## 2020-09-04 DIAGNOSIS — D68.59 PRIMARY HYPERCOAGULABLE STATE (HCC): ICD-10-CM

## 2020-09-04 PROCEDURE — G0444 DEPRESSION SCREEN ANNUAL: HCPCS | Performed by: FAMILY MEDICINE

## 2020-09-04 PROCEDURE — G0439 PPPS, SUBSEQ VISIT: HCPCS | Performed by: FAMILY MEDICINE

## 2020-09-04 RX ORDER — WARFARIN SODIUM 1 MG/1
TABLET ORAL
Qty: 200 TABLET | Refills: 2 | Status: SHIPPED | OUTPATIENT
Start: 2020-09-04

## 2020-09-04 RX ORDER — FLUOXETINE HYDROCHLORIDE 40 MG/1
40 CAPSULE ORAL DAILY
Qty: 90 CAPSULE | Refills: 3 | Status: SHIPPED | OUTPATIENT
Start: 2020-09-04

## 2020-09-04 RX ORDER — METOPROLOL SUCCINATE 50 MG/1
50 TABLET, EXTENDED RELEASE ORAL DAILY
Qty: 90 TABLET | Refills: 1 | Status: SHIPPED | OUTPATIENT
Start: 2020-09-04

## 2020-09-04 NOTE — PATIENT INSTRUCTIONS
Flu vaccine this fall    Continue fluoxitine    Ok to change nexium to pepcid for 2 weeks- if does well, then can stop    Ok to monitor off celebrex and use as needed    Decrease metoprolol to 50 mg a day-- monitor bp    Recheck BP 6 months      Vanesa Garcia following criteria:   • Men who are 73-68 years old and have smoked more than 100 cigarettes in their lifetime   • Anyone with a family history    Colorectal Cancer Screening  Covered up to Age 76     Colonoscopy Screen   Covered every 10 years- more often placed or performed in visit on 09/24/19   • FLU VACC HIGH DOSE PRSV FREE    Please get every year    Pneumococcal 13 (Prevnar)  Covered Once after 65 No orders found for this or any previous visit.  Please get once after your 65th birthday    Pneumococcal

## 2020-09-04 NOTE — PROGRESS NOTES
CC: Annual Physical Exam    HPI:   Jennifer Lund is a 78year old female who presents for a complete physical exam. . Lost 105 lb--with diet and exercise over the last 2 years.     Generally feeling good-- exercise- 6-10 mile a day--pace 20-30 min pace  K 2.0 mg/dL    Total Protein 7.0 6.4 - 8.2 g/dL    Albumin 3.2 (L) 3.4 - 5.0 g/dL    Globulin  3.8 2.8 - 4.4 g/dL    A/G Ratio 0.8 (L) 1.0 - 2.0    FASTING Yes    LIPID PANEL   Result Value Ref Range    Cholesterol, Total 226 (H) <200 mg/dL    HDL Cholestero Outpatient Medications   Medication Sig Dispense Refill   • Metoprolol Succinate ER 50 MG Oral Tablet 24 Hr Take 1 tablet (50 mg total) by mouth daily. 90 tablet 1   • FLUoxetine HCl 40 MG Oral Cap Take 1 capsule (40 mg total) by mouth daily.  90 capsule 3 stenosis 9/11/2018      Past Surgical History:   Procedure Laterality Date   • CHOLECYSTECTOMY     • COLONOSCOPY     • LAMINECTOMY     • TUBAL LIGATION        History reviewed. No pertinent family history.    Social History:   Social History    Socioeconomi No    Vision Problems? : No    Difficulty walking?: No    Difficulty dressing or bathing?: No    Problems with daily activities? : No    Memory Problems?: No      Fall/Risk Assessment     Do you have 3 or more medical conditions?: 0-No    Have you fallen i exertion or at rest.  RESPIRATORY:  Denies shortness of breath, wheezing, cough or sputum. GASTROINTESTINAL:  Denies abdominal pain, nausea, vomiting, constipation, diarrhea, or blood in stool.   MUSCULOSKELETAL:  Denies weakness, muscle aches, back pain, tenderness. ABDOMEN:  Soft, nondistended, nontender,no masses, no hepatosplenomegaly  BACK: No tenderness, no spasm, SLR test negative, FROM. : Deferred   EXTREMITIES:  No edema, no cyanosis, no clubbing, FROM, 2+ dorsalis pedis pulses bilaterally.   NE to monitor    14. Anxiety state  Asymptomatic    15. Long term current use of anticoagulant therapy      16. Migraine without aura and without status migrainosus, not intractable  Intermittent    17.  Primary hypercoagulable state ([address]  Continue anticoagul Fasting Blood Sugar (FSB)   Patient must be diagnosed with one of the following:   • Hypertension   • Dyslipidemia   • Obesity (BMI ³30 kg/m2)   • Previous elevated impaired FBS or GTT   … or any two of the following:   • Overweight (BMI ³25 but <30)   • F Diabetics, people with Glaucoma family history,   Americans over age 48   Americans over age 72 No flowsheet data found.  OK to schedule if you are in this risk group, make sure you have a referral   Bone Density Screening      Bone density s a HepB virus carrier   Homosexual men   Illicit injectable drug abusers     Tetanus Toxoid- Only covered with a cut with metal- TD and TDaP Not covered by Medicare Part B) No orders found for this or any previous visit.  This may be covered with your prescr to 12 months. This note was created utilizing Dragon speech recognition software.  Please excuse any grammatical errors. Call my office if you have any questions regarding this note.

## 2020-09-05 ENCOUNTER — ANTI-COAG VISIT (OUTPATIENT)
Dept: FAMILY MEDICINE CLINIC | Facility: CLINIC | Age: 70
End: 2020-09-05

## 2020-09-05 DIAGNOSIS — Z79.01 LONG TERM CURRENT USE OF ANTICOAGULANT THERAPY: ICD-10-CM

## 2020-09-05 DIAGNOSIS — Z86.73 HX OF STROKE ASSOCIATED WITH BLOOD CLOTTING TENDENCY: ICD-10-CM

## 2020-09-05 NOTE — PROGRESS NOTES
Here for INR check in coumadin clinic in the office.     CURRENT COUMADIN DOSE:  10mg M,W and 8mg daily the rest of the week     CHANGES OR COMPLAINTS:  No changes    INR RESULTS TODAY:  2.2 ( in goal)     PLAN:  Patient saw Dr. Graciela Sanchez for appointment a

## 2020-09-28 ENCOUNTER — ANTI-COAG VISIT (OUTPATIENT)
Dept: FAMILY MEDICINE CLINIC | Facility: CLINIC | Age: 70
End: 2020-09-28

## 2020-09-28 ENCOUNTER — TELEPHONE (OUTPATIENT)
Dept: FAMILY MEDICINE CLINIC | Facility: CLINIC | Age: 70
End: 2020-09-28

## 2020-09-28 DIAGNOSIS — Z79.01 LONG TERM CURRENT USE OF ANTICOAGULANT THERAPY: ICD-10-CM

## 2020-09-28 DIAGNOSIS — Z86.73 HX OF STROKE ASSOCIATED WITH BLOOD CLOTTING TENDENCY: ICD-10-CM

## 2020-09-28 LAB — INR: 3 (ref 0.8–1.2)

## 2020-09-28 NOTE — TELEPHONE ENCOUNTER
JEFFREYI Patient currently at her home in [place] . Her , grandchild and several nieces are positive for Covid 23 since all being together at a wedding on 9/17/20.  has symptoms. Patient so far has no symptoms. [place] is now here in [place].

## 2020-10-06 ENCOUNTER — ANTI-COAG VISIT (OUTPATIENT)
Dept: FAMILY MEDICINE CLINIC | Facility: CLINIC | Age: 70
End: 2020-10-06

## 2020-10-06 ENCOUNTER — TELEPHONE (OUTPATIENT)
Dept: FAMILY MEDICINE CLINIC | Facility: CLINIC | Age: 70
End: 2020-10-06

## 2020-10-06 DIAGNOSIS — Z86.73 HX OF STROKE ASSOCIATED WITH BLOOD CLOTTING TENDENCY: ICD-10-CM

## 2020-10-06 DIAGNOSIS — Z79.01 LONG TERM CURRENT USE OF ANTICOAGULANT THERAPY: ICD-10-CM

## 2020-10-06 PROCEDURE — 85610 PROTHROMBIN TIME: CPT | Performed by: FAMILY MEDICINE

## 2020-10-06 NOTE — TELEPHONE ENCOUNTER
If pt is not taking nexium she should be taking pepcid. - please have her go back on nexium daily for 2 weeks and then try alternating therapy.

## 2020-10-06 NOTE — TELEPHONE ENCOUNTER
Patient was to cut down on nexium. For the past 2 weeks she is taking nexium 4 days per week. On the days she does not take the medication she has terrible heartburn. Please advise.      OTW until [facility] 10/7/20

## 2020-10-09 NOTE — PROGRESS NOTES
INR checked by patient on home meter.     CURRENT COUMADIN DOSE:  10mg Wed and 8mg daily the rest of the week     CHANGES OR COMPLAINTS:  No changes    INR RESULTS TODAY:  2.6 ( in goal)     PLAN:  CPM coumadin   Next INR due in 2 weeks  Patient notified of

## 2020-10-19 ENCOUNTER — TELEPHONE (OUTPATIENT)
Dept: FAMILY MEDICINE CLINIC | Facility: CLINIC | Age: 70
End: 2020-10-19

## 2020-10-19 NOTE — TELEPHONE ENCOUNTER
Pt called- reporting INR 1.8. When I called patient back- she said she was fine and did not need to talk to me.    NOte sent to Nila Wiggins to review in Coumadin clinic

## 2020-10-20 ENCOUNTER — ANTI-COAG VISIT (OUTPATIENT)
Dept: FAMILY MEDICINE CLINIC | Facility: CLINIC | Age: 70
End: 2020-10-20

## 2020-10-20 DIAGNOSIS — Z86.73 HX OF STROKE ASSOCIATED WITH BLOOD CLOTTING TENDENCY: ICD-10-CM

## 2020-10-20 DIAGNOSIS — Z79.01 LONG TERM CURRENT USE OF ANTICOAGULANT THERAPY: ICD-10-CM

## 2020-10-20 NOTE — PROGRESS NOTES
INR checked by patient on home meter.     CURRENT COUMADIN DOSE:  10mg Wed and 8mg daily the rest of the week     CHANGES OR COMPLAINTS:  No changes    INR RESULTS TODAY:  1.8 ( below goal)    PLAN:  Coumadin 10mg today, then resume 10mg Wed and 8mg daily t

## 2020-10-25 DIAGNOSIS — Z00.00 ENCOUNTER FOR ANNUAL HEALTH EXAMINATION: ICD-10-CM

## 2020-10-26 ENCOUNTER — TELEPHONE (OUTPATIENT)
Dept: FAMILY MEDICINE CLINIC | Facility: CLINIC | Age: 70
End: 2020-10-26

## 2020-10-26 ENCOUNTER — ANTI-COAG VISIT (OUTPATIENT)
Dept: FAMILY MEDICINE CLINIC | Facility: CLINIC | Age: 70
End: 2020-10-26

## 2020-10-26 DIAGNOSIS — Z79.01 LONG TERM CURRENT USE OF ANTICOAGULANT THERAPY: ICD-10-CM

## 2020-10-26 DIAGNOSIS — Z86.73 HX OF STROKE ASSOCIATED WITH BLOOD CLOTTING TENDENCY: ICD-10-CM

## 2020-10-26 RX ORDER — CELECOXIB 200 MG/1
CAPSULE ORAL
Qty: 90 CAPSULE | Refills: 1 | Status: SHIPPED | OUTPATIENT
Start: 2020-10-26 | End: 2021-05-18

## 2020-10-26 NOTE — TELEPHONE ENCOUNTER
Future appt:    Last Appointment with provider:   9/4/2020  Last [place] [facility]:  9/4/2020  Cholesterol, Total (mg/dL)   Date Value   09/01/2020 226 (H)     HDL Cholesterol (mg/dL)   Date Value   09/01/2020 71 (H)     LDL Cholesterol (mg/dL)

## 2020-10-26 NOTE — PROGRESS NOTES
INR checked by patient on home meter.     CURRENT COUMADIN DOSE:  10mg M,W and 7.5mg daily the rest of the week     CHANGES OR COMPLAINTS:  No changes    INR RESULTS TODAY:  1.7 ( below goal)    PLAN:  Increase coumadin to 10mg M,W,F and 7.5mg daily the res

## 2020-11-02 ENCOUNTER — ANTI-COAG VISIT (OUTPATIENT)
Dept: FAMILY MEDICINE CLINIC | Facility: CLINIC | Age: 70
End: 2020-11-02

## 2020-11-02 ENCOUNTER — TELEPHONE (OUTPATIENT)
Dept: FAMILY MEDICINE CLINIC | Facility: CLINIC | Age: 70
End: 2020-11-02

## 2020-11-02 DIAGNOSIS — Z86.73 HX OF STROKE ASSOCIATED WITH BLOOD CLOTTING TENDENCY: ICD-10-CM

## 2020-11-02 DIAGNOSIS — Z79.01 LONG TERM CURRENT USE OF ANTICOAGULANT THERAPY: ICD-10-CM

## 2020-11-02 NOTE — PROGRESS NOTES
INR checked on home meter    CURRENT COUMADIN DOSE:  10mg M,W,F and 8mg daily the rest of the week     CHANGES OR COMPLAINTS:  No changes    INR RESULTS TODAY:  1.7 ( below goal)     PLAN:  Increase coumadin to 8mg Tu, Th, Sa and 10mg daily the rest of the

## 2020-11-09 ENCOUNTER — ANTI-COAG VISIT (OUTPATIENT)
Dept: FAMILY MEDICINE CLINIC | Facility: CLINIC | Age: 70
End: 2020-11-09

## 2020-11-09 ENCOUNTER — TELEPHONE (OUTPATIENT)
Dept: FAMILY MEDICINE CLINIC | Facility: CLINIC | Age: 70
End: 2020-11-09

## 2020-11-09 DIAGNOSIS — Z79.01 LONG TERM CURRENT USE OF ANTICOAGULANT THERAPY: ICD-10-CM

## 2020-11-09 DIAGNOSIS — Z86.73 HX OF STROKE ASSOCIATED WITH BLOOD CLOTTING TENDENCY: ICD-10-CM

## 2020-11-09 NOTE — PROGRESS NOTES
INR checked by patient on home meter.     CURRENT COUMADIN DOSE:  8mg M,W,F and 10mg daily the rest of the week     CHANGES OR COMPLAINTS:  No changes    INR RESULTS TODAY:  1.6 ( INR went down further below goal of 2-3 despite increase in dose)     PLAN:

## 2020-11-16 ENCOUNTER — ANTI-COAG VISIT (OUTPATIENT)
Dept: FAMILY MEDICINE CLINIC | Facility: CLINIC | Age: 70
End: 2020-11-16

## 2020-11-16 DIAGNOSIS — Z86.73 HX OF STROKE ASSOCIATED WITH BLOOD CLOTTING TENDENCY: ICD-10-CM

## 2020-11-16 DIAGNOSIS — Z79.01 LONG TERM CURRENT USE OF ANTICOAGULANT THERAPY: ICD-10-CM

## 2020-11-17 NOTE — PROGRESS NOTES
INR checked by patient on home meter. CURRENT COUMADIN DOSE:  8mg Sat and 10mg daily the rest of the week    CHANGES OR COMPLAINTS:  No changes    INR RESULTS TODAY:  1.5 ( below goal)    PLAN:  Increase coumadin to 12mg daily   Next INR due in 3 days.

## 2020-11-18 ENCOUNTER — TELEPHONE (OUTPATIENT)
Dept: FAMILY MEDICINE CLINIC | Facility: CLINIC | Age: 70
End: 2020-11-18

## 2020-11-18 NOTE — TELEPHONE ENCOUNTER
out of range   1.9         taken today        please contact pt with directions / orders         No future appointments.

## 2020-11-19 ENCOUNTER — TELEPHONE (OUTPATIENT)
Dept: FAMILY MEDICINE CLINIC | Facility: CLINIC | Age: 70
End: 2020-11-19

## 2020-11-19 ENCOUNTER — ANTI-COAG VISIT (OUTPATIENT)
Dept: FAMILY MEDICINE CLINIC | Facility: CLINIC | Age: 70
End: 2020-11-19

## 2020-11-19 DIAGNOSIS — Z79.01 LONG TERM CURRENT USE OF ANTICOAGULANT THERAPY: ICD-10-CM

## 2020-11-19 DIAGNOSIS — K21.9 GASTROESOPHAGEAL REFLUX DISEASE WITHOUT ESOPHAGITIS: ICD-10-CM

## 2020-11-19 DIAGNOSIS — Z86.73 HX OF STROKE ASSOCIATED WITH BLOOD CLOTTING TENDENCY: ICD-10-CM

## 2020-11-19 DIAGNOSIS — Z79.01 LONG TERM CURRENT USE OF ANTICOAGULANT THERAPY: Primary | ICD-10-CM

## 2020-11-19 NOTE — TELEPHONE ENCOUNTER
Refill request from patient for warfarin 5mg tablets and [facility]     Last INR tested on home meter today.      Future appt:    Last Appointment with provider:   9/4/2020 physical with Dr. Akosua Nunez  The patient is asked to return

## 2020-11-20 RX ORDER — WARFARIN SODIUM 5 MG/1
TABLET ORAL
Qty: 108 TABLET | Refills: 2 | Status: SHIPPED | OUTPATIENT
Start: 2020-11-20 | End: 2020-11-25

## 2020-11-20 RX ORDER — ESOMEPRAZOLE MAGNESIUM 40 MG/1
CAPSULE, DELAYED RELEASE ORAL
Qty: 90 CAPSULE | Refills: 3 | Status: SHIPPED | OUTPATIENT
Start: 2020-11-20

## 2020-11-24 NOTE — PROGRESS NOTES
INR checked by patient on home meter.     CURRENT COUMADIN DOSE:  12mg daily    CHANGES OR COMPLAINTS:  No changes    INR RESULTS TODAY:  1.9 ( rising toward goal)    PLAN:  Coumadin 10mg Wed and Sun and 12mg daily the rest of the week   Next INR due 11/23/

## 2020-11-25 ENCOUNTER — ANTI-COAG VISIT (OUTPATIENT)
Dept: FAMILY MEDICINE CLINIC | Facility: CLINIC | Age: 70
End: 2020-11-25

## 2020-11-25 ENCOUNTER — TELEPHONE (OUTPATIENT)
Dept: FAMILY MEDICINE CLINIC | Facility: CLINIC | Age: 70
End: 2020-11-25

## 2020-11-25 DIAGNOSIS — Z86.73 HX OF STROKE ASSOCIATED WITH BLOOD CLOTTING TENDENCY: ICD-10-CM

## 2020-11-25 DIAGNOSIS — Z79.01 LONG TERM CURRENT USE OF ANTICOAGULANT THERAPY: ICD-10-CM

## 2020-11-25 PROCEDURE — 93793 ANTICOAG MGMT PT WARFARIN: CPT | Performed by: FAMILY MEDICINE

## 2020-11-25 PROCEDURE — 85610 PROTHROMBIN TIME: CPT | Performed by: FAMILY MEDICINE

## 2020-11-25 RX ORDER — WARFARIN SODIUM 5 MG/1
TABLET ORAL
Qty: 108 TABLET | Refills: 2 | Status: SHIPPED | OUTPATIENT
Start: 2020-11-25 | End: 2021-03-10

## 2020-11-25 NOTE — TELEPHONE ENCOUNTER
INR was due 2 days ago. Called patient. She states she checked INR today and it was 2.3 on home meter. See anticoagulation encounter for INR and coumadin management.

## 2020-11-25 NOTE — TELEPHONE ENCOUNTER
[facility] of 5mg tablets of warfarin dose with a new script. Patient states she is running out of 5mg tablets. INR checked today on her home meter.  New coumadin dose will now be 12mg Wed, Sat and 10mg daily the rest of

## 2020-12-01 NOTE — PROGRESS NOTES
INR checked by patient on home meter.     CURRENT COUMADIN DOSE:  12mg Wed and Sat and 10mg daily the rest of the week     CHANGES OR COMPLAINTS:  No changes    INR RESULTS TODAY:  2.3 ( in goal)    PLAN:  CPM coumadin   Next INR in one week   Patient notif

## 2020-12-02 ENCOUNTER — TELEPHONE (OUTPATIENT)
Dept: FAMILY MEDICINE CLINIC | Facility: CLINIC | Age: 70
End: 2020-12-02

## 2020-12-02 ENCOUNTER — ANTI-COAG VISIT (OUTPATIENT)
Dept: FAMILY MEDICINE CLINIC | Facility: CLINIC | Age: 70
End: 2020-12-02

## 2020-12-02 DIAGNOSIS — Z79.01 LONG TERM CURRENT USE OF ANTICOAGULANT THERAPY: ICD-10-CM

## 2020-12-02 DIAGNOSIS — Z86.73 HX OF STROKE ASSOCIATED WITH BLOOD CLOTTING TENDENCY: ICD-10-CM

## 2020-12-02 NOTE — PROGRESS NOTES
Pt INR today 1.9. Pt is taking 12 mg on M/W/F and 10 mg rest of day. Pt mentioned that she was previously taking brand form /Coumadin- only. Approx.  one month ago, pt started to incorporate 1 mg tablets that her generic and now pt states she is fu

## 2020-12-08 ENCOUNTER — ANTI-COAG VISIT (OUTPATIENT)
Dept: FAMILY MEDICINE CLINIC | Facility: CLINIC | Age: 70
End: 2020-12-08

## 2020-12-08 DIAGNOSIS — Z79.01 LONG TERM CURRENT USE OF ANTICOAGULANT THERAPY: ICD-10-CM

## 2020-12-08 DIAGNOSIS — Z86.73 HX OF STROKE ASSOCIATED WITH BLOOD CLOTTING TENDENCY: ICD-10-CM

## 2020-12-08 NOTE — PROGRESS NOTES
INR today:  2.3  INR reported by [facility]. Pt contacted. No concerns or problems reported. Pt is currently taking 12 mg M/W/F and 10 mg rest of days.

## 2020-12-24 ENCOUNTER — ANTI-COAG VISIT (OUTPATIENT)
Dept: FAMILY MEDICINE CLINIC | Facility: CLINIC | Age: 70
End: 2020-12-24

## 2020-12-24 DIAGNOSIS — Z86.73 HX OF STROKE ASSOCIATED WITH BLOOD CLOTTING TENDENCY: ICD-10-CM

## 2020-12-24 DIAGNOSIS — Z79.01 LONG TERM CURRENT USE OF ANTICOAGULANT THERAPY: ICD-10-CM

## 2020-12-24 NOTE — PROGRESS NOTES
INR checked by patient on home meter.     CURRENT COUMADIN DOSE:  12mg M,W,F and 10mg daily the rest of the week       INR RESULTS TODAY:  2.4 ( in goal and stable)     PLAN:  CPM coumadin   Next INR in one month  Message left for patient with detailed coum Statement Selected

## 2021-01-27 DIAGNOSIS — Z00.00 ENCOUNTER FOR ANNUAL HEALTH EXAMINATION: ICD-10-CM

## 2021-01-27 RX ORDER — METOPROLOL SUCCINATE 100 MG/1
100 TABLET, EXTENDED RELEASE ORAL DAILY
Qty: 90 TABLET | Refills: 3 | Status: SHIPPED | OUTPATIENT
Start: 2021-01-27 | End: 2021-05-18 | Stop reason: DRUGHIGH

## 2021-01-27 NOTE — TELEPHONE ENCOUNTER
Future appt:    Last Appointment with provider:   9/4/2020; Recheck BP 6 months    Last appointment at [facility]:  9/4/2020  Cholesterol, Total (mg/dL)   Date Value   09/01/2020 226 (H)     HDL Cholesterol (mg/dL)   Date Value   09/01/2020 71 (H)     LDL Cholesterol (mg/dL)   Date Value   09/01/2020 146 (H)     Triglycerides (mg/dL)   Date Value   09/01/2020 45     No results found for: EAG, A1C  Lab Results   Component Value Date    T4F 1.1 09/01/2020    TSH 2.820 09/01/2020     Last RF:  9/4/2020    No follow-ups on file.

## 2021-01-29 LAB — INR: 2.5 (ref 0.8–1.2)

## 2021-01-30 ENCOUNTER — ANTI-COAG VISIT (OUTPATIENT)
Dept: FAMILY MEDICINE CLINIC | Facility: CLINIC | Age: 71
End: 2021-01-30

## 2021-01-30 DIAGNOSIS — Z86.73 HX OF STROKE ASSOCIATED WITH BLOOD CLOTTING TENDENCY: ICD-10-CM

## 2021-01-30 DIAGNOSIS — Z79.01 LONG TERM CURRENT USE OF ANTICOAGULANT THERAPY: ICD-10-CM

## 2021-01-30 NOTE — PROGRESS NOTES
INR checked on home meter.     CURRENT COUMADIN DOSE:  12mg M,W,F and 10mg daily the rest of the week     CHANGES OR COMPLAINTS:  No changes    INR RESULTS TODAY:  2.5 ( in goal)    PLAN:  CPM coumadin  Next INR due in one month  Detailed message left for p

## 2021-03-03 ENCOUNTER — ANTI-COAG VISIT (OUTPATIENT)
Dept: FAMILY MEDICINE CLINIC | Facility: CLINIC | Age: 71
End: 2021-03-03

## 2021-03-03 DIAGNOSIS — Z79.01 LONG TERM CURRENT USE OF ANTICOAGULANT THERAPY: ICD-10-CM

## 2021-03-03 DIAGNOSIS — Z86.73 HX OF STROKE ASSOCIATED WITH BLOOD CLOTTING TENDENCY: ICD-10-CM

## 2021-03-03 LAB — INR: 2.6 (ref 0.8–1.2)

## 2021-03-03 NOTE — PROGRESS NOTES
INR checked by patient on home meter.     CURRENT COUMADIN DOSE:  12mg M,W,F and 10mg daily the rest of the week    CHANGES OR COMPLAINTS:  No changes    INR RESULTS TODAY:  2.6 ( in goal)    PLAN:  CPM coumadin   Next INR due in one month  Patient notified

## 2021-03-05 DIAGNOSIS — Z23 NEED FOR VACCINATION: ICD-10-CM

## 2021-03-10 ENCOUNTER — TELEPHONE (OUTPATIENT)
Dept: FAMILY MEDICINE CLINIC | Facility: CLINIC | Age: 71
End: 2021-03-10

## 2021-03-10 DIAGNOSIS — Z79.01 LONG TERM CURRENT USE OF ANTICOAGULANT THERAPY: ICD-10-CM

## 2021-03-10 RX ORDER — WARFARIN SODIUM 5 MG/1
TABLET ORAL
Qty: 180 TABLET | Refills: 1 | Status: SHIPPED | OUTPATIENT
Start: 2021-03-10 | End: 2021-07-31

## 2021-03-10 NOTE — TELEPHONE ENCOUNTER
Future appt:    Last Appointment with provider:   Visit date not found  Last appointment at [facility]:  Visit date not found  Last px: 9/4/20    Last INR: 3/3/21- pt is taking 12mg on M/W/F and 10mg rest of days.     Pt was given  Refill of Warfarin on

## 2021-04-03 ENCOUNTER — ANTI-COAG VISIT (OUTPATIENT)
Dept: FAMILY MEDICINE CLINIC | Facility: CLINIC | Age: 71
End: 2021-04-03

## 2021-04-03 DIAGNOSIS — Z86.73 HX OF STROKE ASSOCIATED WITH BLOOD CLOTTING TENDENCY: ICD-10-CM

## 2021-04-03 DIAGNOSIS — Z79.01 LONG TERM CURRENT USE OF ANTICOAGULANT THERAPY: ICD-10-CM

## 2021-04-03 NOTE — PROGRESS NOTES
CPM -please clarify with patient if she is taking 12 mg Monday Wednesday Friday or 12 mg Wednesday Saturday and 10 mg the rest (written instructions say Monday Wednesday Friday, calendar says Wednesday Saturday)    12mg M,W,F and 10mg daily the rest of the

## 2021-04-03 NOTE — PROGRESS NOTES
INR checked by patient on home meter. Current Coumadin dose:  12mg M,W,F and 10mg the rest of the week    Changes or complaints:  No changes    INR results today:  2.4 (in goal)    Please advise Coumadin dose and next INR.

## 2021-04-03 NOTE — PROGRESS NOTES
Verified dose is 12mg M, W, F and 10mg all other days with patient. Patient will continue this and plans to check INR again the first of May.

## 2021-05-05 ENCOUNTER — TELEPHONE (OUTPATIENT)
Dept: FAMILY MEDICINE CLINIC | Facility: CLINIC | Age: 71
End: 2021-05-05

## 2021-05-05 ENCOUNTER — ANTI-COAG VISIT (OUTPATIENT)
Dept: FAMILY MEDICINE CLINIC | Facility: CLINIC | Age: 71
End: 2021-05-05

## 2021-05-05 DIAGNOSIS — Z86.73 HX OF STROKE ASSOCIATED WITH BLOOD CLOTTING TENDENCY: ICD-10-CM

## 2021-05-05 DIAGNOSIS — Z79.01 LONG TERM CURRENT USE OF ANTICOAGULANT THERAPY: ICD-10-CM

## 2021-05-05 PROCEDURE — 93793 ANTICOAG MGMT PT WARFARIN: CPT | Performed by: FAMILY MEDICINE

## 2021-05-05 NOTE — PROGRESS NOTES
INR checked by patient on home meter.     CURRENT COUMADIN DOSE:  12mg M,W,F and 10mg all other days of the week    CHANGES OR COMPLAINTS:  Just finished antifungal medication  Death in family last week     INR RESULTS TODAY:  3.2    PLAN:  5mg today, then

## 2021-05-11 ENCOUNTER — TELEPHONE (OUTPATIENT)
Dept: FAMILY MEDICINE CLINIC | Facility: CLINIC | Age: 71
End: 2021-05-11

## 2021-05-11 ENCOUNTER — ANTI-COAG VISIT (OUTPATIENT)
Dept: FAMILY MEDICINE CLINIC | Facility: CLINIC | Age: 71
End: 2021-05-11

## 2021-05-11 DIAGNOSIS — Z79.01 LONG TERM CURRENT USE OF ANTICOAGULANT THERAPY: ICD-10-CM

## 2021-05-11 DIAGNOSIS — Z86.73 HX OF STROKE ASSOCIATED WITH BLOOD CLOTTING TENDENCY: ICD-10-CM

## 2021-05-11 PROCEDURE — 93793 ANTICOAG MGMT PT WARFARIN: CPT | Performed by: FAMILY MEDICINE

## 2021-05-11 NOTE — PROGRESS NOTES
INR checked by patient on home meter.     CURRENT COUMADIN DOSE:  5mg one day, then resumed 12mg M,W,F and 10mg all other days of the week    CHANGES OR COMPLAINTS:  Stress- death in family ( mother in law)    INR RESULTS TODAY:  3.9 ( above goal)    PLAN:

## 2021-05-18 ENCOUNTER — TELEPHONE (OUTPATIENT)
Dept: FAMILY MEDICINE CLINIC | Facility: CLINIC | Age: 71
End: 2021-05-18

## 2021-05-18 ENCOUNTER — OFFICE VISIT (OUTPATIENT)
Dept: FAMILY MEDICINE CLINIC | Facility: CLINIC | Age: 71
End: 2021-05-18
Payer: MEDICARE

## 2021-05-18 ENCOUNTER — ANTI-COAG VISIT (OUTPATIENT)
Dept: FAMILY MEDICINE CLINIC | Facility: CLINIC | Age: 71
End: 2021-05-18

## 2021-05-18 VITALS
DIASTOLIC BLOOD PRESSURE: 68 MMHG | RESPIRATION RATE: 16 BRPM | BODY MASS INDEX: 29.82 KG/M2 | WEIGHT: 166.19 LBS | SYSTOLIC BLOOD PRESSURE: 122 MMHG | HEIGHT: 62.75 IN | TEMPERATURE: 98 F | HEART RATE: 64 BPM

## 2021-05-18 DIAGNOSIS — Z86.73 HX OF STROKE ASSOCIATED WITH BLOOD CLOTTING TENDENCY: ICD-10-CM

## 2021-05-18 DIAGNOSIS — F41.1 ANXIETY STATE: ICD-10-CM

## 2021-05-18 DIAGNOSIS — M81.0 AGE-RELATED OSTEOPOROSIS WITHOUT CURRENT PATHOLOGICAL FRACTURE: ICD-10-CM

## 2021-05-18 DIAGNOSIS — I10 BENIGN ESSENTIAL HYPERTENSION: ICD-10-CM

## 2021-05-18 DIAGNOSIS — E78.49 FAMILIAL MULTIPLE LIPOPROTEIN-TYPE HYPERLIPIDEMIA: ICD-10-CM

## 2021-05-18 DIAGNOSIS — Z79.01 LONG TERM CURRENT USE OF ANTICOAGULANT THERAPY: ICD-10-CM

## 2021-05-18 DIAGNOSIS — F32.1 MAJOR DEPRESSIVE DISORDER, SINGLE EPISODE, MODERATE (HCC): ICD-10-CM

## 2021-05-18 DIAGNOSIS — Z79.01 LONG TERM CURRENT USE OF ANTICOAGULANT THERAPY: Primary | ICD-10-CM

## 2021-05-18 PROCEDURE — 99213 OFFICE O/P EST LOW 20 MIN: CPT | Performed by: FAMILY MEDICINE

## 2021-05-18 NOTE — PROGRESS NOTES
INR checked by patient on home meter. CURRENT COUMADIN DOSE:  5mg one day, then 10mg daily     CHANGES OR COMPLAINTS:  100 pound intended wieght loss over past 2 years.      INR RESULTS TODAY:  3.1 ( coming down toward goal)     PLAN:  Coumadin 10mg tor

## 2021-05-18 NOTE — PROGRESS NOTES
[facility] [place]  PROGRESS NOTE  Chief Complaint:   Patient presents with:  Blood Pressure      HPI:   This is a 69year old female [address] [facility]    Walking 8 mile a day  Walk about 2-3 hours continuous    Has  been compliant with [place] Medications   Medication Sig Dispense Refill   • L-Lysine 1000 MG Oral Tab Take 1 tablet by mouth daily.      • Warfarin Sodium (COUMADIN) 5 MG Oral Tab 2 tablets by mouth daily 180 tablet 1   • Esomeprazole Magnesium 40 MG Oral Capsule Delayed Release TAKE headache, dizziness, syncope, numbness or tingling in the extremities,change in bowel or bladder control. HEMATOLOGIC:  Denies anemia, bleeding or bruising. PSYCHIATRIC:  Denies depression or anxiety.   ENDOCRINOLOGIC:  Denies excessive sweating, cold or essential hypertension  -     CBC WITH DIFFERENTIAL WITH PLATELET; Future  -     COMP METABOLIC PANEL (14); Future  -     LIPID PANEL;  Future  -     TSH W REFLEX TO FREE T4; Future  -     URINALYSIS WITH CULTURE REFLEX; Future    Familial multiple lipoprot nonruptured     Acute ill-defined cerebrovascular disease     Major depressive disorder, single episode, moderate (HCC)     Diverticulosis     Eczema     Esophageal reflux     Routine general medical examination at a health care facility     Familial multi

## 2021-05-18 NOTE — PATIENT INSTRUCTIONS
Continue present medications, monitor BP    Monitor INR    rec fasting labs and physical in September

## 2021-05-25 ENCOUNTER — ANTI-COAG VISIT (OUTPATIENT)
Dept: FAMILY MEDICINE CLINIC | Facility: CLINIC | Age: 71
End: 2021-05-25

## 2021-05-25 DIAGNOSIS — Z79.01 LONG TERM CURRENT USE OF ANTICOAGULANT THERAPY: ICD-10-CM

## 2021-05-25 DIAGNOSIS — Z86.73 HX OF STROKE ASSOCIATED WITH BLOOD CLOTTING TENDENCY: ICD-10-CM

## 2021-05-25 PROCEDURE — 93793 ANTICOAG MGMT PT WARFARIN: CPT | Performed by: FAMILY MEDICINE

## 2021-05-25 NOTE — PROGRESS NOTES
INR checked by patient on home meter. CURRENT COUMADIN DOSE:  10mg  daily    CHANGES OR COMPLAINTS:  No changes    INR RESULTS TODAY:  2.7 ( in goal)    PLAN:  CPM coumadin   Next INR due in 2 weeks  Patient notified of coumadin instructions.

## 2021-06-08 ENCOUNTER — ANTI-COAG VISIT (OUTPATIENT)
Dept: FAMILY MEDICINE CLINIC | Facility: CLINIC | Age: 71
End: 2021-06-08

## 2021-06-08 DIAGNOSIS — Z86.73 HX OF STROKE ASSOCIATED WITH BLOOD CLOTTING TENDENCY: ICD-10-CM

## 2021-06-08 DIAGNOSIS — Z79.01 LONG TERM CURRENT USE OF ANTICOAGULANT THERAPY: ICD-10-CM

## 2021-06-08 PROCEDURE — 93793 ANTICOAG MGMT PT WARFARIN: CPT | Performed by: FAMILY MEDICINE

## 2021-06-08 NOTE — PROGRESS NOTES
INR checked by patient on home meter. CURRENT COUMADIN DOSE:  10mg daily    CHANGES OR COMPLAINTS:  No changes    INR RESULTS TODAY:  2.9 ( in goal)     PLAN:  CPM coumadin   Next INR in one month on home meter. Patient notified.

## 2021-07-06 ENCOUNTER — TELEPHONE (OUTPATIENT)
Dept: FAMILY MEDICINE CLINIC | Facility: CLINIC | Age: 71
End: 2021-07-06

## 2021-07-06 NOTE — TELEPHONE ENCOUNTER
forgot her INR machine in [facility]    -  needs INR check ASAP -  7/7 No openings    -   had a family  emergency  / death and will be in town longer then expected. ..     OTW til Wed   7/7       Future Appointments   Date Time Provider Soraya Roberts   9/2/202

## 2021-07-07 NOTE — TELEPHONE ENCOUNTER
Called patient and her neighbor in [place] will be shipping her INR meter to her. It will arrive on Friday, so she no longer needs an INR appt. She will call Friday with INR results.

## 2021-07-13 ENCOUNTER — TELEPHONE (OUTPATIENT)
Dept: FAMILY MEDICINE CLINIC | Facility: CLINIC | Age: 71
End: 2021-07-13

## 2021-07-13 ENCOUNTER — ANTI-COAG VISIT (OUTPATIENT)
Dept: FAMILY MEDICINE CLINIC | Facility: CLINIC | Age: 71
End: 2021-07-13

## 2021-07-13 DIAGNOSIS — Z79.01 LONG TERM CURRENT USE OF ANTICOAGULANT THERAPY: ICD-10-CM

## 2021-07-13 DIAGNOSIS — Z86.73 HX OF STROKE ASSOCIATED WITH BLOOD CLOTTING TENDENCY: ICD-10-CM

## 2021-07-13 LAB — INR: 3.4 (ref 0.8–1.2)

## 2021-07-13 PROCEDURE — 93793 ANTICOAG MGMT PT WARFARIN: CPT | Performed by: FAMILY MEDICINE

## 2021-07-13 NOTE — TELEPHONE ENCOUNTER
Out of Range INR  taken today  =   3.4      Please contact patient @ [phone]    with  directions / orders         Future Appointments   Date Time Provider Soraya Roberts   9/2/2021  9:00 AM REF [place] [facility]   9/7/2021  2:00 PM Neil

## 2021-07-13 NOTE — PROGRESS NOTES
Reviewed. Patient with INR 3.4. Her goal is 2-3. I recommend that she hold her Coumadin today increase her greens in diet back to her usual level and continue her same dose of coumadin. Patient to recheck INR 2 weeks.

## 2021-07-13 NOTE — PROGRESS NOTES
Pt INR today: 3.4. Pt goal range: 2-3  Pt is taking 10mg Coumadin per day  Pt states she hasn't eaten enough greens the last two weeks. Pt states she has eaten less salads and greens beans than her usual  Pt is doing okay    Routed to PCP.   Caroline Jordan is out

## 2021-07-30 ENCOUNTER — ANTI-COAG VISIT (OUTPATIENT)
Dept: FAMILY MEDICINE CLINIC | Facility: CLINIC | Age: 71
End: 2021-07-30

## 2021-07-30 ENCOUNTER — TELEPHONE (OUTPATIENT)
Dept: FAMILY MEDICINE CLINIC | Facility: CLINIC | Age: 71
End: 2021-07-30

## 2021-07-30 DIAGNOSIS — Z86.73 HX OF STROKE ASSOCIATED WITH BLOOD CLOTTING TENDENCY: ICD-10-CM

## 2021-07-30 DIAGNOSIS — Z79.01 LONG TERM CURRENT USE OF ANTICOAGULANT THERAPY: ICD-10-CM

## 2021-07-30 LAB — INR: 3.1 (ref 0.8–1.2)

## 2021-07-30 NOTE — PROGRESS NOTES
Reviewed. Pt to hold dose today , then continue 10 mg a day, recheck 2 weeks. - Thursday for Natalee Meier

## 2021-07-30 NOTE — PROGRESS NOTES
Informed pt of Dr. Tyrell Bertrand recommendations. Pt verbalizes understanding and will schedule appointment with Kenn Evans in 2 weeks.

## 2021-07-31 DIAGNOSIS — Z79.01 LONG TERM CURRENT USE OF ANTICOAGULANT THERAPY: ICD-10-CM

## 2021-07-31 RX ORDER — WARFARIN SODIUM 5 MG/1
TABLET ORAL
Qty: 180 TABLET | Refills: 3 | Status: SHIPPED | OUTPATIENT
Start: 2021-07-31

## 2021-07-31 NOTE — TELEPHONE ENCOUNTER
Please advise refill request from [facility] for Warfarin 5mg tablets. Current warfarin dose: 10mg daily   Last INR yesterday. Future appt:     Your appointments     Date & Time Appointment Department [facility])    Sep 02, 2021  9:00 AM CDT Labor

## 2021-08-13 ENCOUNTER — ANTI-COAG VISIT (OUTPATIENT)
Dept: FAMILY MEDICINE CLINIC | Facility: CLINIC | Age: 71
End: 2021-08-13

## 2021-08-13 ENCOUNTER — TELEPHONE (OUTPATIENT)
Dept: FAMILY MEDICINE CLINIC | Facility: CLINIC | Age: 71
End: 2021-08-13

## 2021-08-13 DIAGNOSIS — Z79.01 LONG TERM CURRENT USE OF ANTICOAGULANT THERAPY: ICD-10-CM

## 2021-08-13 DIAGNOSIS — Z86.73 HX OF STROKE ASSOCIATED WITH BLOOD CLOTTING TENDENCY: ICD-10-CM

## 2021-08-13 LAB — INR: 4.4 (ref 0.8–1.2)

## 2021-08-13 PROCEDURE — 93793 ANTICOAG MGMT PT WARFARIN: CPT | Performed by: FAMILY MEDICINE

## 2021-08-13 NOTE — PROGRESS NOTES
INR today: 4.4. Pt contacted. Pt takes 10mg daily. Pt states she thought she forgot to take her dose on Wed. Pt states she ended up taking a double dose. Pt is feeling fine. No concerns reported. No bruising or bleeding reported.

## 2021-08-13 NOTE — PROGRESS NOTES
Pt informed. Recommendations relayed. Pt verbalized understanding. Pt agreed to check INR again next week Friday, 8/20/21.

## 2021-08-20 ENCOUNTER — ANTI-COAG VISIT (OUTPATIENT)
Dept: FAMILY MEDICINE CLINIC | Facility: CLINIC | Age: 71
End: 2021-08-20

## 2021-08-20 ENCOUNTER — TELEPHONE (OUTPATIENT)
Dept: FAMILY MEDICINE CLINIC | Facility: CLINIC | Age: 71
End: 2021-08-20

## 2021-08-20 DIAGNOSIS — Z86.73 HX OF STROKE ASSOCIATED WITH BLOOD CLOTTING TENDENCY: ICD-10-CM

## 2021-08-20 DIAGNOSIS — Z79.01 LONG TERM CURRENT USE OF ANTICOAGULANT THERAPY: ICD-10-CM

## 2021-08-20 LAB — INR: 1.7 (ref 0.8–1.2)

## 2021-08-20 PROCEDURE — 93793 ANTICOAG MGMT PT WARFARIN: CPT | Performed by: FAMILY MEDICINE

## 2021-08-20 NOTE — PROGRESS NOTES
INR checked by patient on home meter. CURRENT COUMADIN DOSE:  Held 2 days, then resumed 10mg daily     CHANGES OR COMPLAINTS:  No changes    INR RESULTS TODAY:  1.7 ( INR is down now)    PLAN:  Coumadin 10mg daily   Next INR due in one week.    Patient

## 2021-08-27 ENCOUNTER — ANTI-COAG VISIT (OUTPATIENT)
Dept: FAMILY MEDICINE CLINIC | Facility: CLINIC | Age: 71
End: 2021-08-27

## 2021-08-27 ENCOUNTER — TELEPHONE (OUTPATIENT)
Dept: FAMILY MEDICINE CLINIC | Facility: CLINIC | Age: 71
End: 2021-08-27

## 2021-08-27 DIAGNOSIS — Z79.01 LONG TERM CURRENT USE OF ANTICOAGULANT THERAPY: ICD-10-CM

## 2021-08-27 DIAGNOSIS — Z86.73 HX OF STROKE ASSOCIATED WITH BLOOD CLOTTING TENDENCY: ICD-10-CM

## 2021-08-27 LAB — INR: 2.9 (ref 0.8–1.2)

## 2021-08-27 PROCEDURE — 93793 ANTICOAG MGMT PT WARFARIN: CPT | Performed by: FAMILY MEDICINE

## 2021-08-27 NOTE — PROGRESS NOTES
INR:  2.9 dated 8/27/21. Fax from home monitoring company. Pt contacted. Pt wanted to mention that she walked 5 miles on Sunday, 8/22. Pt was in front of her house and pt states she was dizzy. Pt states she ended up passing out.   Pt states her neigh

## 2021-08-27 NOTE — PROGRESS NOTES
Reviewed, continue present coumadin dosing.     Appt planned  Future Appointments   Date Time Provider Soraya Roberts   9/2/2021  9:00 AM REF [place] [facility]   9/7/2021  2:00 PM Danni Yusuf MD [facility]

## 2021-08-27 NOTE — PROGRESS NOTES
Opened abstract encounter in error. Received [facility] report re: pt INR. Pt INR today 2.9.     Will contact pt- please refer to anti-coag form dated 8/27/21

## 2021-08-28 NOTE — TELEPHONE ENCOUNTER
Patients questions answered yesterday regarding home INR results. See anticoagulation encounter on 8/27/21.

## 2021-09-17 ENCOUNTER — ANTI-COAG VISIT (OUTPATIENT)
Dept: FAMILY MEDICINE CLINIC | Facility: CLINIC | Age: 71
End: 2021-09-17

## 2021-09-17 DIAGNOSIS — Z79.01 LONG TERM CURRENT USE OF ANTICOAGULANT THERAPY: ICD-10-CM

## 2021-09-17 DIAGNOSIS — Z86.73 HX OF STROKE ASSOCIATED WITH BLOOD CLOTTING TENDENCY: ICD-10-CM

## 2021-09-17 LAB
INR: 1.7 (ref 0.8–1.2)
INR: 4.8 (ref 0.8–1.2)

## 2021-09-17 PROCEDURE — 93793 ANTICOAG MGMT PT WARFARIN: CPT | Performed by: FAMILY MEDICINE

## 2021-09-17 NOTE — PROGRESS NOTES
INR checked by patient on home meter.     CURRENT COUMADIN DOSE:  10mg daily      INR RESULTS TODAY:  4.8 ( above goal)    PLAN:  Hold coumadin today  Coumadin 5mg tomorrow  Then on Sunday 9/19/21 please resume 10mg daily  Next INR in 3 days on Monday 9/20/

## 2021-09-21 ENCOUNTER — ANTI-COAG VISIT (OUTPATIENT)
Dept: FAMILY MEDICINE CLINIC | Facility: CLINIC | Age: 71
End: 2021-09-21

## 2021-09-21 ENCOUNTER — TELEPHONE (OUTPATIENT)
Dept: FAMILY MEDICINE CLINIC | Facility: CLINIC | Age: 71
End: 2021-09-21

## 2021-09-21 DIAGNOSIS — Z79.01 LONG TERM CURRENT USE OF ANTICOAGULANT THERAPY: ICD-10-CM

## 2021-09-21 DIAGNOSIS — Z86.73 HX OF STROKE ASSOCIATED WITH BLOOD CLOTTING TENDENCY: ICD-10-CM

## 2021-09-21 LAB — INR: 1.7 (ref 0.8–1.2)

## 2021-09-21 PROCEDURE — 93793 ANTICOAG MGMT PT WARFARIN: CPT | Performed by: FAMILY MEDICINE

## 2021-09-21 NOTE — PROGRESS NOTES
INR checked by patient on home meter.     CURRENT COUMADIN DOSE:  Held one day, then 2.5mg Sat , then resumed 10mg daily     CHANGES OR COMPLAINTS:  Changed from coumadin brand name to warfarin     INR RESULTS TODAY:  1.7 ( INR now down)    PLAN:  Resume co

## 2021-10-01 ENCOUNTER — ANTI-COAG VISIT (OUTPATIENT)
Dept: FAMILY MEDICINE CLINIC | Facility: CLINIC | Age: 71
End: 2021-10-01

## 2021-10-01 DIAGNOSIS — Z86.73 HX OF STROKE ASSOCIATED WITH BLOOD CLOTTING TENDENCY: ICD-10-CM

## 2021-10-01 DIAGNOSIS — Z79.01 LONG TERM CURRENT USE OF ANTICOAGULANT THERAPY: ICD-10-CM

## 2021-10-01 NOTE — PROGRESS NOTES
INR: 2.5.- home monitor. Pt therapeutic range:  2-3. Pt states she is trying to manage her stress- due to selling home. Pt states she took 5mg Coumdin on Tuesday and Friday and 10mg rest of days.

## 2021-10-15 ENCOUNTER — ANTI-COAG VISIT (OUTPATIENT)
Dept: FAMILY MEDICINE CLINIC | Facility: CLINIC | Age: 71
End: 2021-10-15

## 2021-10-15 DIAGNOSIS — Z86.73 HX OF STROKE ASSOCIATED WITH BLOOD CLOTTING TENDENCY: ICD-10-CM

## 2021-10-15 DIAGNOSIS — Z79.01 LONG TERM CURRENT USE OF ANTICOAGULANT THERAPY: ICD-10-CM

## 2021-10-15 NOTE — PROGRESS NOTES
Pt home INR check:  2.6. Pt contacted- is doing well- no concerns reported. Pt is taking 5 mg on Tues and Fri and 10 mg rest of days.   Routed to pcp

## 2021-10-19 ENCOUNTER — TELEPHONE (OUTPATIENT)
Dept: FAMILY MEDICINE CLINIC | Facility: CLINIC | Age: 71
End: 2021-10-19

## 2021-10-19 NOTE — TELEPHONE ENCOUNTER
Pts daughter calling to schedule appt for pt to be seen today - nothing available. States pt was in ER 8/21/21 and diagnosed with vertigo that is not getting better. Please advise.

## 2021-10-19 NOTE — TELEPHONE ENCOUNTER
Spoke with pt's ambar Nguyen- consent on file. Dariela Mcdonald states pt has end dizziness/ vertigo off/on for the last couple of months. Dariela Mcdonald states pt went to the ER back in August - pt did not follow up in office.   Dariela Mcdonald states pt is planning to move to

## 2021-10-20 ENCOUNTER — OFFICE VISIT (OUTPATIENT)
Dept: FAMILY MEDICINE CLINIC | Facility: CLINIC | Age: 71
End: 2021-10-20
Payer: MEDICARE

## 2021-10-20 ENCOUNTER — TELEPHONE (OUTPATIENT)
Dept: FAMILY MEDICINE CLINIC | Facility: CLINIC | Age: 71
End: 2021-10-20

## 2021-10-20 VITALS
TEMPERATURE: 98 F | SYSTOLIC BLOOD PRESSURE: 110 MMHG | HEART RATE: 68 BPM | OXYGEN SATURATION: 96 % | WEIGHT: 176.63 LBS | HEIGHT: 62.75 IN | BODY MASS INDEX: 31.69 KG/M2 | DIASTOLIC BLOOD PRESSURE: 76 MMHG | RESPIRATION RATE: 16 BRPM

## 2021-10-20 DIAGNOSIS — Z86.73 HX OF STROKE ASSOCIATED WITH BLOOD CLOTTING TENDENCY: ICD-10-CM

## 2021-10-20 DIAGNOSIS — R07.89 CHEST PRESSURE: ICD-10-CM

## 2021-10-20 DIAGNOSIS — I10 BENIGN ESSENTIAL HYPERTENSION: ICD-10-CM

## 2021-10-20 DIAGNOSIS — R42 DIZZINESS: Primary | ICD-10-CM

## 2021-10-20 PROCEDURE — 99214 OFFICE O/P EST MOD 30 MIN: CPT | Performed by: NURSE PRACTITIONER

## 2021-10-20 PROCEDURE — 93000 ELECTROCARDIOGRAM COMPLETE: CPT | Performed by: NURSE PRACTITIONER

## 2021-10-20 NOTE — TELEPHONE ENCOUNTER
Patients daughter called. She thinks it was good for patient to go to the ER but concerned jamar is going to release her without proper follow up. She states the ER told her father that it was overkill to send her to the ER.  She is aware [facility] was called [facility]

## 2021-10-20 NOTE — PROGRESS NOTES
HPI:    Patient ID: Lenora Polanco is a 66year old female. HPI     Patient is present with feeling of not feeling well. States that she feels like crap. Wonders if related to depression. Was in [place] last year and here this year.  Is going back to [facility] NEEDED 15 g 0   • Calcium Carbonate-Vit D-Min (CALCIUM 1200 OR) Take by mouth. • Flaxseed, Linseed, (FLAX SEED OIL) 1000 MG Oral Cap Take 1 capsule by mouth daily. • Krill Oil 1000 MG Oral Cap Take 1 capsule by mouth daily.        • Multiple Vitam normal.                ASSESSMENT/PLAN:   Dizziness  (primary encounter diagnosis)  Chest pressure  Hx of stroke associated with blood clotting tendency  Benign essential hypertension    No orders of the defined types were placed in this encounter.       Me

## 2021-10-25 DIAGNOSIS — Z00.00 ENCOUNTER FOR ANNUAL HEALTH EXAMINATION: ICD-10-CM

## 2021-10-25 RX ORDER — FLUOXETINE HYDROCHLORIDE 40 MG/1
CAPSULE ORAL
Qty: 90 CAPSULE | Refills: 0 | OUTPATIENT
Start: 2021-10-25

## 2021-10-25 NOTE — TELEPHONE ENCOUNTER
Future appt:    Last Appointment with provider:   5/18/2021  Last appointment at [facility]:  10/20/2021    Fasting labs and px due in Sept    Pt contacted- pt is moving to [facility] on 10/27.     Pt states she has a new doctor lined up- pt has appt with new pro

## 2021-10-27 ENCOUNTER — TELEPHONE (OUTPATIENT)
Dept: FAMILY MEDICINE CLINIC | Facility: CLINIC | Age: 71
End: 2021-10-27

## 2021-10-27 NOTE — TELEPHONE ENCOUNTER
Pt submitted a signed authorization request to have her entire medical record from 1/1/2020 to 10/22/2021 sent to . This was sent to [employer].

## 2022-01-28 DIAGNOSIS — K21.9 GASTROESOPHAGEAL REFLUX DISEASE WITHOUT ESOPHAGITIS: ICD-10-CM

## 2022-01-28 RX ORDER — ESOMEPRAZOLE MAGNESIUM 40 MG/1
CAPSULE, DELAYED RELEASE ORAL
Qty: 90 CAPSULE | Refills: 3 | OUTPATIENT
Start: 2022-01-28

## 2022-01-28 NOTE — TELEPHONE ENCOUNTER
Future appt:    Last Appointment with provider:   Visit date not found  Last appointment at [facility]:  10/20/2021  Last px: 9/4/20    Esomeprazole refil request noted from mail order pharmacy. Pt moved to [facility].   Pt contacted- pt agreed to call [employer] [facility]

## 2022-02-25 ENCOUNTER — TELEPHONE (OUTPATIENT)
Dept: FAMILY MEDICINE CLINIC | Facility: CLINIC | Age: 72
End: 2022-02-25

## 2022-06-08 NOTE — PROGRESS NOTES
Can we call for a 3 month followup? INR in goal range at 2.7. CPM coumadin. INR in one month. Patient notified of coumadin instructions.

## (undated) NOTE — LETTER
06/17/20        Lisbeth Lr  [address]      Dear Wendy Grover,    [address] records indicate that you have outstanding lab work and or testing that was ordered for you and has not yet been completed:  Orders Placed This Encounter      DOLORES W